# Patient Record
Sex: FEMALE
[De-identification: names, ages, dates, MRNs, and addresses within clinical notes are randomized per-mention and may not be internally consistent; named-entity substitution may affect disease eponyms.]

---

## 2018-11-29 ENCOUNTER — HOSPITAL ENCOUNTER (EMERGENCY)
Dept: HOSPITAL 97 - ER | Age: 28
Discharge: HOME | End: 2018-11-29
Payer: SELF-PAY
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
## 2018-11-29 DIAGNOSIS — S30.1XXA: Primary | ICD-10-CM
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
## 2018-11-29 DIAGNOSIS — Z3A.01: ICD-10-CM

## 2018-11-29 LAB — UA COMPLETE W REFLEX CULTURE PNL UR: (no result)

## 2018-11-29 PROCEDURE — 76813 OB US NUCHAL MEAS 1 GEST: CPT

## 2018-11-29 PROCEDURE — 87086 URINE CULTURE/COLONY COUNT: CPT

## 2018-11-29 PROCEDURE — 81015 MICROSCOPIC EXAM OF URINE: CPT

## 2018-11-29 PROCEDURE — 87088 URINE BACTERIA CULTURE: CPT

## 2018-11-29 PROCEDURE — 99284 EMERGENCY DEPT VISIT MOD MDM: CPT

## 2018-11-29 PROCEDURE — 81003 URINALYSIS AUTO W/O SCOPE: CPT

## 2018-11-29 NOTE — RAD REPORT
EXAM DESCRIPTION:  US - TRANSVAG OB - 2018 8:38 pm

 

CLINICAL HISTORY:  Pregnancy status post fall

 

COMPARISON:  None

 

FINDINGS:  The uterus measures 8 x 5 x 5 centimeters. A gestational sac is not seen within the endome
trium. Small amount of fluid is present within the endometrium. A fibroid is not seen

 

Ovaries are normal in size and echotexture. No significant free fluid

 

IMPRESSION:  Gestational sac is not seen within the endometrium. This could represent a normal intrau
terine pregnancy in which the gestational sac is not seen secondary to the early age. An  can
 also have this appearance.

 

A follow up endovaginal sonogram in 1 week is recommended

## 2018-11-29 NOTE — ER
Nurse's Notes                                                                                     

 [facility]                                                                

Name: Dipti Donovan                                                                               

Age: 28 yrs                                                                                       

Sex: Female                                                                                       

: 1990                                                                                   

MRN: [id number]                                                                                   

Arrival Date: 2018                                                                          

Time: 17:30                                                                                       

Account#: [id number]                                                                            

Bed 26                                                                                            

Private MD:                                                                                       

Diagnosis: Contusion of abdominal wall;early pregnancy                                            

                                                                                                  

Presentation:                                                                                     

                                                                                             

17:57 Presenting complaint: Patient states: Reports slipping while walking down stairs and    aj  

      sliding on her bottom down "a few stairs". Reports lower back pain that started today.      

      Denies vaginal bleeding. Ambulated with steady quick gait and displayed no discomfort       

      when sitting or standing. Care prior to arrival: None. Mechanism of Injury: Fall from       

      standing position. Trauma event details: Injury occurred in the [facility],         

      Injury occurred: at home. Injury occurred: 2018.                               

17:57 Acuity: MATTHEW 4                                                                           aj  

17:57 Method Of Arrival: Ambulatory                                                           aj  

20:54 Transition of care: patient was not received from another setting of care. Onset of     rv  

      symptoms was 2018 at 12:00. Risk Assessment: Do you want to hurt yourself      

      or someone else? Patient reports no desire to harm self or others. Initial Sepsis           

      Screen: Does the patient meet any 2 criteria? No. Patient's initial sepsis screen is        

      negative. Does the patient have a suspected source of infection? No. Patient's initial      

      sepsis screen is negative.                                                                  

                                                                                                  

OB/GYN:                                                                                           

18:01 LMP 10/23/2018                                                                            

                                                                                                  

Trauma Activation: Not Applicable                                                                 

 Physician: ED Physician; Name: ; Notified At: ; Arrived At:                                      

 Physician: General Surgeon; Name: ; Notified At: ; Arrived At:                                   

 Physician: Radiology; Name: ; Notified At: ; Arrived At:                                         

 Physician: Respiratory; Name: ; Notified At: ; Arrived At:                                       

 Physician: Lab; Name: ; Notified At: ; Arrived At:                                               

                                                                                                  

Historical:                                                                                       

- Allergies:                                                                                      

18:01 No Known Allergies;                                                                     aj  

- Home Meds:                                                                                      

18:01 None [Active];                                                                          aj  

- PMHx:                                                                                           

18:01 None;                                                                                   aj  

- PSHx:                                                                                           

18:01 None;                                                                                   aj  

                                                                                                  

- Immunization history: Last tetanus immunization: - up to date.                                  

- Social history:: Smoking status: Patient/guardian denies using tobacco.                         

- Ebola Screening: : Patient negative for fever greater than or equal to 101.5 degrees            

  Fahrenheit, and additional compatible Ebola Virus Disease symptoms Patient denies               

  exposure to infectious person Patient denies travel to an Ebola-affected area in the            

  21 days before illness onset No symptoms or risks identified at this time.                      

                                                                                                  

                                                                                                  

Screenin:53 Abuse screen: Denies threats or abuse. Denies injuries from another. Nutritional        rv  

      screening: No deficits noted. Tuberculosis screening: No symptoms or risk factors           

      identified. Fall Risk None identified.                                                      

                                                                                                  

Primary Survey:                                                                                   

17:57 Breathing/Chest: Respiratory pattern: regular, Respiratory effort: spontaneous,         aj  

      unlabored, Breath sounds: clear, bilaterally. Circulation: Skin color: pink, Skin           

      temperature: warm, dry. Disability Alert.                                                   

20:54 Reassessment Breathing/Chest Respiratory pattern Regular.                               rv  

                                                                                                  

Assessment:                                                                                       

17:57 General: Appears in no apparent distress. comfortable, Behavior is calm, cooperative,   aj  

      appropriate for age. Pain: Complains of pain in buttocks. Neuro: Level of Consciousness     

      is awake, alert, obeys commands, Oriented to person, place, time, situation,                

      Appropriate for age. Respiratory: Airway is patent Respiratory effort is even,              

      unlabored, Respiratory pattern is regular, symmetrical. Derm: Skin is intact, is            

      healthy with good turgor, Skin is pink, warm \T\ dry. normal. Musculoskeletal: Reports      

      pain in buttocks.                                                                           

19:59 Reassessment: Patient appears in no apparent distress at this time.                     rv  

                                                                                                  

Vital Signs:                                                                                      

17:57  / 66; Pulse 94; Resp 20; Temp 97.5; Pulse Ox 100% on R/A; Weight 57.15 kg;       aj  

      Height 5 ft. 1 in. (154.94 cm);                                                             

19:59  / 90; Pulse 88; Resp 16; Pulse Ox 100% on R/A;                                   rv  

20:00 BP 98 / 61; Pulse 82; Resp 16; Pulse Ox 100% ;                                          rv  

20:00 Pulse 84; Resp 17; Pulse Ox 100% ;                                                      rv  

20:53  / 59; Pulse 82; Resp 15; Pulse Ox 100% on R/A;                                   rv  

17:57 Body Mass Index 23.81 (57.15 kg, 154.94 cm)                                             aj  

                                                                                                  

Brody Coma Score:                                                                               

17:57 Eye Response: spontaneous(4). Verbal Response: oriented(5). Motor Response: obeys         

      commands(6). Total: 15.                                                                     

                                                                                                  

Trauma Score (Adult):                                                                             

17:57 Eye Response: spontaneous(1); Verbal Response: oriented(1); Motor Response: obeys       aj  

      commands(2); Systolic BP: > 89 mm Hg(4); Respiratory Rate: 10 to 29 per min(4); Brody     

      Score: 15; Trauma Score: 12                                                                 

                                                                                                  

ED Course:                                                                                        

17:30 Patient arrived in ED.                                                                  mr  

17:59 Triage completed.                                                                       aj  

18:01 Arm band placed on left wrist. Patient placed in waiting room, Patient notified of wait aj  

      time.                                                                                       

19:41 Myranda Hidalgo LVN is Primary Nurse.                                                     ed1 

19:41 Kiko Koch MD is Attending Physician.                                              gs  

19:58 Urine Culture Sent.                                                                     rv  

19:58 Urine Microscopic Only Sent.                                                            rv  

20:38 TRANSVAG OB In Process Unspecified.                                                     EDMS

20:38 Ultrasound completed. Patient tolerated well. Patient moved back from ultrasound.       cy  

20:46 Becki Beth MD is Referral Physician.                                                  gs  

20:53 No provider procedures requiring assistance completed. Patient did not have IV access   rv  

      during this emergency room visit.                                                           

20:55 Patient has correct armband on for positive identification. Bed in low position. Call   rv  

      light in reach. Side rails up X 1. Pulse ox on. NIBP on.                                    

                                                                                                  

Administered Medications:                                                                         

No medications were administered                                                                  

                                                                                                  

                                                                                                  

Outcome:                                                                                          

20:47 Discharge ordered by MD.                                                                gs  

20:54 Discharged to home ambulatory.                                                          rv  

20:54 Condition: good                                                                             

20:54 Discharge instructions given to patient, Instructed on discharge instructions, follow       

      up and referral plans. Demonstrated understanding of instructions, follow-up care.          

20:55 Patient left the ED.                                                                    rv  

                                                                                                  

Signatures:                                                                                       

Dispatcher [employer]                           EDShaunna Hicks, GINA HansenaLizzette                                 mr                                                   

GwenMyranda, SHIRA                       LVN  ed1                                                  

Kiko Koch MD MD gs Yong, Chheannith                                                                                

Elder Mari RN RN   rv                                                   

                                                                                                  

**************************************************************************************************

## 2018-12-16 ENCOUNTER — HOSPITAL ENCOUNTER (EMERGENCY)
Dept: HOSPITAL 97 - ER | Age: 28
Discharge: HOME | End: 2018-12-16
Payer: COMMERCIAL

## 2018-12-16 DIAGNOSIS — O20.0: Primary | ICD-10-CM

## 2018-12-16 DIAGNOSIS — Z3A.08: ICD-10-CM

## 2018-12-16 LAB
BUN BLD-MCNC: 15 MG/DL (ref 7–18)
GLUCOSE SERPLBLD-MCNC: 97 MG/DL (ref 74–106)
HCG SERPL-ACNC: 1369 MIU/ML (ref 1–3)
HCT VFR BLD CALC: 43.4 % (ref 36–45)
LYMPHOCYTES # SPEC AUTO: 1.4 K/UL (ref 0.7–4.9)
MCH RBC QN AUTO: 25.4 PG (ref 27–35)
MCV RBC: 78.6 FL (ref 80–100)
PMV BLD: 8.9 FL (ref 7.6–11.3)
POTASSIUM SERPL-SCNC: 3.4 MMOL/L (ref 3.5–5.1)
RBC # BLD: 5.52 M/UL (ref 3.86–4.86)

## 2018-12-16 PROCEDURE — 81025 URINE PREGNANCY TEST: CPT

## 2018-12-16 PROCEDURE — 36415 COLL VENOUS BLD VENIPUNCTURE: CPT

## 2018-12-16 PROCEDURE — 99283 EMERGENCY DEPT VISIT LOW MDM: CPT

## 2018-12-16 PROCEDURE — 85025 COMPLETE CBC W/AUTO DIFF WBC: CPT

## 2018-12-16 PROCEDURE — 96360 HYDRATION IV INFUSION INIT: CPT

## 2018-12-16 PROCEDURE — 86901 BLOOD TYPING SEROLOGIC RH(D): CPT

## 2018-12-16 PROCEDURE — 80048 BASIC METABOLIC PNL TOTAL CA: CPT

## 2018-12-16 PROCEDURE — 81003 URINALYSIS AUTO W/O SCOPE: CPT

## 2018-12-16 PROCEDURE — 84702 CHORIONIC GONADOTROPIN TEST: CPT

## 2018-12-16 PROCEDURE — 86900 BLOOD TYPING SEROLOGIC ABO: CPT

## 2018-12-16 PROCEDURE — 76817 TRANSVAGINAL US OBSTETRIC: CPT

## 2018-12-16 NOTE — RAD REPORT
EXAM DESCRIPTION:  US - Transvaginal OB - 12/16/2018 6:29 pm

 

CLINICAL HISTORY:  Vaginal bleeding, beta HCG 1369

 

COMPARISON:  None.

 

FINDINGS:  Both ovaries are identifiable. Doppler evaluation shows normal blood flow within the ovari
an stroma. No adnexal mass to suspect an ectopic pregnancy. No blood or fluid in the cul-de-sac.

 

Uterus is 10.1 x 4.4 x 4.9 cm. No myometrial mass identifiable.

 

No intrauterine gestational sac, yolk sac or fetal pole identifiable. There is heterogeneous material
 in the lower uterine segment and heterogeneity of the endometrial tissue in the fundal and midportio
n of the uterus.

 

IMPRESSION:  No IUP is identified. Heterogeneous material in the lower uterine segment may be remnant
 of miscarriage.

 

No adnexal mass identified. No sonographic findings for ectopic pregnancy.

## 2018-12-16 NOTE — EDPHYS
Physician Documentation                                                                           

 [facility]                                                                

Name: Dipti Donovan                                                                               

Age: 28 yrs                                                                                       

Sex: Female                                                                                       

: 1990                                                                                   

MRN: [id number]                                                                                   

Arrival Date: 2018                                                                          

Time: 15:11                                                                                       

Account#: [id number]                                                                            

Bed 16                                                                                            

Private MD:                                                                                       

ED Physician Kiko Koch                                                                       

HPI:                                                                                              

                                                                                             

15:42 This 28 yrs old  Female presents to ER via Ambulatory with complaints of        kb  

      Vaginal Bleeding, + Preg <12wks.                                                            

15:42 The patient presents to the emergency department with vaginal bleeding, that is         kb  

      moderate. The estimated gestational age is 8 weeks. Pregnancy course: Prenatal care:        

      none, Leakage of Fluid: none appreciated. Previous pregnancies: in previous pregnancies     

      patient has had. Associated signs and symptoms: Pertinent positives: vaginal bleeding,      

      Pertinent negatives: abdominal pain, chest pain, diarrhea, dysuria, fever, frequency,       

      nausea, ruptured membranes, seizure, shortness of breath, vaginal discharge, vomiting.      

      The patient has not experienced similar symptoms in the past. The patient has not           

      recently seen a physician. Pt reports vaginal bleeding that started approx 30 minutes       

      pta. Reports it started when she got out of the shower. .                                   

                                                                                                  

OB/GYN:                                                                                           

15:42  6,  0, Living 5, LMP 10/23/2018                                         kb  

                                                                                                  

Historical:                                                                                       

- Allergies:                                                                                      

15:14 No Known Allergies;                                                                     hj  

- Home Meds:                                                                                      

15:14 Prenatal Vitamin Oral tab 1 tab once daily [Active];                                    hj  

- PMHx:                                                                                           

15:14 None;                                                                                   hj  

- PSHx:                                                                                           

15:14 None;                                                                                   hj  

                                                                                                  

- Immunization history:: Adult Immunizations up to date.                                          

- Social history:: Smoking status: Patient/guardian denies using tobacco,                         

  Patient/guardian denies using alcohol.                                                          

- Ebola Screening: : Patient negative for fever greater than or equal to 101.5 degrees            

  Fahrenheit, and additional compatible Ebola Virus Disease symptoms Patient denies               

  exposure to infectious person Patient denies travel to an Ebola-affected area in the            

  21 days before illness onset.                                                                   

                                                                                                  

                                                                                                  

ROS:                                                                                              

15:42 Constitutional: Negative for fever, chills, and weight loss, Cardiovascular: Negative   kb  

      for chest pain, palpitations, and edema, Respiratory: Negative for shortness of breath,     

      cough, wheezing, and pleuritic chest pain, Abdomen/GI: Negative for abdominal pain,         

      nausea, vomiting, diarrhea, and constipation, Back: Negative for injury and pain,           

      MS/Extremity: Negative for injury and deformity, Skin: Negative for injury, rash, and       

      discoloration, Neuro: Negative for headache, weakness, numbness, tingling, and seizure.     

15:42 : Positive for vaginal bleeding.                                                          

                                                                                                  

Exam:                                                                                             

15:42 Constitutional:  This is a well developed, well nourished patient who is awake, alert,  kb  

      and in no acute distress. Head/Face:  Normocephalic, atraumatic. ENT:  Nares patent. No     

      nasal discharge, no septal abnormalities noted.  Tympanic membranes are normal and          

      external auditory canals are clear.  Oropharynx with no redness, swelling, or masses,       

      exudates, or evidence of obstruction, uvula midline.  Mucous membranes moist. Neck:         

      Trachea midline, no thyromegaly or masses palpated, and no cervical lymphadenopathy.        

      Supple, full range of motion without nuchal rigidity, or vertebral point tenderness.        

      No Meningismus. Chest/axilla:  Normal chest wall appearance and motion.  Nontender with     

      no deformity.  No lesions are appreciated. Cardiovascular:  Regular rate and rhythm         

      with a normal S1 and S2.  No gallops, murmurs, or rubs.  Normal PMI, no JVD.  No pulse      

      deficits. Respiratory:  Lungs have equal breath sounds bilaterally, clear to                

      auscultation and percussion.  No rales, rhonchi or wheezes noted.  No increased work of     

      breathing, no retractions or nasal flaring. Abdomen/GI:  Soft, non-tender, with normal      

      bowel sounds.  No distension or tympany.  No guarding or rebound.  No evidence of           

      tenderness throughout. Skin:  Warm, dry with normal turgor.  Normal color with no           

      rashes, no lesions, and no evidence of cellulitis. MS/ Extremity:  Pulses equal, no         

      cyanosis.  Neurovascular intact.  Full, normal range of motion. Neuro:  Awake and           

      alert, GCS 15, oriented to person, place, time, and situation.  Cranial nerves II-XII       

      grossly intact.  Motor strength 5/5 in all extremities.  Sensory grossly intact.            

      Cerebellar exam normal.  Normal gait.                                                       

                                                                                                  

Vital Signs:                                                                                      

15:15  / 71; Pulse 128; Resp 18; Temp 97.8(O); Pulse Ox 100% on R/A; Weight 57.15 kg;   hj  

      Height 5 ft. 1 in. (154.94 cm); Pain 0/10;                                                  

16:00  / 70; Pulse 89; Resp 16; Pulse Ox 100% on R/A; Pain 0/10;                        rb1 

17:00  / 78; Pulse 82; Resp 17; Pulse Ox 100% on R/A;                                   rb1 

18:00  / 63; Pulse 75; Resp 16; Pulse Ox 100% on R/A;                                   rb1 

18:49  / 75; Pulse 78; Resp 16; Pulse Ox 100% on R/A;                                   rb1 

15:15 Body Mass Index 23.81 (57.15 kg, 154.94 cm)                                             hj  

                                                                                                  

MDM:                                                                                              

15:18 Patient medically screened.                                                             kb  

15:44 Data reviewed: vital signs, nurses notes. Data interpreted: Pulse oximetry: on room air kb  

      is 100 %. Interpretation: normal.                                                           

18:22 Counseling: I had a detailed discussion with the patient and/or guardian regarding: the kb  

      historical points, exam findings, and any diagnostic results supporting the                 

      discharge/admit diagnosis, lab results, radiology results, the need for outpatient          

      follow up, an OB/Gyne specialist, to return to the emergency department if symptoms         

      worsen or persist or if there are any questions or concerns that arise at home.             

                                                                                                  

                                                                                             

15:25 Order name: Quantitative Hcg                                                              

                                                                                             

15:25 Order name: Abo/rh Typing                                                                 

                                                                                             

15:25 Order name: Basic Metabolic Panel                                                         

                                                                                             

15:25 Order name: CBC with Diff                                                                 

                                                                                             

15:32 Order name: Urine Dipstick--Ancillary (enter results)                                   [facility] 

                                                                                             

15:32 Order name: Urine Pregnancy--Ancillary (enter results)                                  [facility] 

                                                                                             

15:34 Order name: Urine Pregnancy--Ancillary; Complete Time: 15:35                            [facility]

                                                                                             

15:34 Order name: Urine Dipstick-Ancillary; Complete Time: 15:35                              [facility]

                                                                                             

17:03 Order name: CBC with Automated Diff; Complete Time: 17:04                               [facility]

                                                                                             

17:09 Order name: Basic Metabolic Panel; Complete Time: 17:12                                 [facility]

                                                                                             

17:09 Order name: HCG, Quantitative; Complete Time: 17:12                                     [facility]

                                                                                             

17:12 Order name: US Transvaginal Ob                                                          kb  

                                                                                             

18:10 Order name: ABO/RH no charge; Complete Time: 18:12                                      [facility]

                                                                                             

18:11 Order name: ABO/RH typing; Complete Time: 18:12                                         [facility]

                                                                                             

15:25 Order name: Urine Pregnancy Test (obtain specimen); Complete Time: 16:13                kb  

                                                                                             

15:25 Order name: IV Saline Lock; Complete Time: 16:13                                        kb  

                                                                                             

15:25 Order name: Labs collected and sent; Complete Time: 16:13                               kb  

                                                                                             

15:25 Order name: NPO; Complete Time: 16:13                                                   kb  

                                                                                             

15:25 Order name: Urine Dipstick-Ancillary (obtain specimen); Complete Time: 16:13            kb  

                                                                                                  

Administered Medications:                                                                         

16:18 Drug: NS 0.9% 1000 ml Route: IV; Rate: 1000 ml; Site: left antecubital;                 rb1 

17:20 Follow up: IV Status: Completed infusion                                                rb1 

18:30 Drug: Tylenol 1000 mg Route: PO;                                                        rb1 

18:50 Follow up: Response: No adverse reaction                                                rb1 

                                                                                                  

                                                                                                  

Disposition:                                                                                      

                                                                                             

16:28 Co-signature as Attending Physician, Kiko Koch MD.                                    

                                                                                                  

Disposition:                                                                                      

18 18:29 Discharged to Home. Impression: Threatened .                               

- Condition is Stable.                                                                            

- Discharge Instructions: Threatened Miscarriage, Easy-to-Read.                                   

                                                                                                  

- Medication Reconciliation Form, Thank You Letter, Antibiotic Education, Prescription            

  Opioid Use form.                                                                                

- Follow up: Emergency Department; When: As needed; Reason: Worsening of condition.               

  Follow up: Private Physician; When: 2 - 3 days; Reason: Recheck today's complaints,             

  Continuance of care, Re-evaluation by your physician.                                           

- Notes: Have repeat quantitative Hcg done in 48 hours                                            

                                                                                                  

                                                                                                  

Signatures:                                                                                       

Dispatcher [employer]                           [facility]                                                 

Meron Deleon FNP-C                 FNP-Sony Tian RN RN                                                      

Bernie Mak RN                     RN   rb1                                                  

Kiko Koch MD MD                                                      

                                                                                                  

Corrections: (The following items were deleted from the chart)                                    

                                                                                             

18:52 18:29 2018 18:29 Discharged to Home. Impression: Threatened . Condition   rb1 

      is Stable. Discharge Instructions: Threatened Miscarriage, Easy-to-Read. Forms are          

      Medication Reconciliation Form, Thank You Letter, Antibiotic Education, Prescription        

      Opioid Use. Follow up: Emergency Department; When: As needed; Reason: Worsening of          

      condition. Follow up: Private Physician; When: 2 - 3 days; Reason: Recheck today's          

      complaints, Continuance of care, Re-evaluation by your physician. kb                        

                                                                                                  

**************************************************************************************************

## 2018-12-16 NOTE — ER
Nurse's Notes                                                                                     

 [facility]                                                                

Name: Dipti Donovan                                                                               

Age: 28 yrs                                                                                       

Sex: Female                                                                                       

: 1990                                                                                   

MRN: [id number]                                                                                   

Arrival Date: 2018                                                                          

Time: 15:11                                                                                       

Account#: [id number]                                                                            

Bed 16                                                                                            

Private MD:                                                                                       

Diagnosis: Threatened                                                                     

                                                                                                  

Presentation:                                                                                     

                                                                                             

15:11 Presenting complaint: Patient states: LMP- 10/23/18; 8 weeks AOG; im bleeding that      hj  

      started today; heavy bleeding; denies abd pain; A0;. Transition of care: patient        

      was not received from another setting of care. Onset of symptoms was 2018.     

      Risk Assessment: Do you want to hurt yourself or someone else? Patient reports no           

      desire to harm self or others. Initial Sepsis Screen: Does the patient meet any 2           

      criteria? No. Patient's initial sepsis screen is negative. Does the patient have a          

      suspected source of infection? No. Patient's initial sepsis screen is negative. Care        

      prior to arrival: None.                                                                     

15:11 Method Of Arrival: Ambulatory                                                             

15:11 Acuity: MATTHEW 3                                                                           hj  

                                                                                                  

Triage Assessment:                                                                                

15:14 General: Appears in no apparent distress. uncomfortable, Behavior is calm, cooperative, hj  

      appropriate for age. Pain: Denies pain. : Reports vaginal bleeding that is heavy flow     

      since few minutes ago;.                                                                     

                                                                                                  

OB/GYN:                                                                                           

15:42  6,  0, Living 5, LMP 10/23/2018                                         kb  

                                                                                                  

Historical:                                                                                       

- Allergies:                                                                                      

15:14 No Known Allergies;                                                                     hj  

- Home Meds:                                                                                      

15:14 Prenatal Vitamin Oral tab 1 tab once daily [Active];                                    hj  

- PMHx:                                                                                           

15:14 None;                                                                                   hj  

- PSHx:                                                                                           

15:14 None;                                                                                   hj  

                                                                                                  

- Immunization history:: Adult Immunizations up to date.                                          

- Social history:: Smoking status: Patient/guardian denies using tobacco,                         

  Patient/guardian denies using alcohol.                                                          

- Ebola Screening: : Patient negative for fever greater than or equal to 101.5 degrees            

  Fahrenheit, and additional compatible Ebola Virus Disease symptoms Patient denies               

  exposure to infectious person Patient denies travel to an Ebola-affected area in the            

  21 days before illness onset.                                                                   

                                                                                                  

                                                                                                  

Screening:                                                                                        

15:15 Abuse screen: Denies threats or abuse. Denies injuries from another. Nutritional        hj  

      screening: No deficits noted. Tuberculosis screening: No symptoms or risk factors           

      identified. Fall Risk None identified.                                                      

                                                                                                  

Assessment:                                                                                       

15:20 General: Appears in no apparent distress. comfortable, Behavior is calm, cooperative,   rb1 

      Denies fever. Pain: Denies pain. Neuro: Level of Consciousness is awake, alert, obeys       

      commands, Oriented to person, place, time, situation, Reports dizziness.                    

      Cardiovascular: Capillary refill < 3 seconds is brisk in bilateral fingers.                 

      Respiratory: Reports cough that is non-productive, since x 2 weeks Airway is patent         

      Respiratory effort is even, unlabored, Respiratory pattern is regular, symmetrical. GI:     

      No signs and/or symptoms were reported involving the gastrointestinal system. :           

      Reports vaginal bleeding that is bright red, moderate flow, since 30 minutes ago Denies     

      blood clots. Derm: Skin is dry, Skin is normal, Skin temperature is warm.                   

16:20 Reassessment: Patient appears in no apparent distress at this time. Patient and/or      rb1 

      family updated on plan of care and expected duration. Pain level reassessed. Patient is     

      alert, oriented x 3, equal unlabored respirations, skin warm/dry/pink. Patient denies       

      pain at this time.                                                                          

17:20 Reassessment: Patient appears in no apparent distress at this time. No changes from     rb1 

      previously documented assessment.                                                           

18:20 Reassessment: Patient appears in no apparent distress at this time. Patient and/or      rb1 

      family updated on plan of care and expected duration. Pain level reassessed. Patient is     

      alert, oriented x 3, equal unlabored respirations, skin warm/dry/pink. Pt. ambulated to     

      the bathroom without difficulty. Pt reports that the vaginal bleeding has stopped at        

      this time. Provider notified.                                                               

                                                                                                  

Vital Signs:                                                                                      

15:15  / 71; Pulse 128; Resp 18; Temp 97.8(O); Pulse Ox 100% on R/A; Weight 57.15 kg;   hj  

      Height 5 ft. 1 in. (154.94 cm); Pain 0/10;                                                  

16:00  / 70; Pulse 89; Resp 16; Pulse Ox 100% on R/A; Pain 0/10;                        rb1 

17:00  / 78; Pulse 82; Resp 17; Pulse Ox 100% on R/A;                                   rb1 

18:00  / 63; Pulse 75; Resp 16; Pulse Ox 100% on R/A;                                   rb1 

18:49  / 75; Pulse 78; Resp 16; Pulse Ox 100% on R/A;                                   rb1 

15:15 Body Mass Index 23.81 (57.15 kg, 154.94 cm)                                               

                                                                                                  

ED Course:                                                                                        

15:11 Patient arrived in ED.                                                                  as  

15:12 Meron Deleon FNP-C is [facility].                                                        kb  

15:12 Kiko Koch MD is Attending Physician.                                              kb  

15:13 Triage completed.                                                                       hj  

15:15 Arm band placed on right wrist.                                                         hj  

15:15 Patient has correct armband on for positive identification. Placed in gown. Bed in low  hj  

      position. Call light in reach. Side rails up X 1.                                           

15:21 Bernie Mak, RN is Primary Nurse.                                                   rb1 

16:20 Pulse ox on. NIBP on.                                                                   rb1 

18:27 Ultrasound completed. Patient tolerated well. Notified NP/PA meron.                   sg3 

18:51 No provider procedures requiring assistance completed. IV discontinued, intact,         rb1 

      bleeding controlled, No redness/swelling at site. Pressure dressing applied.                

                                                                                                  

Administered Medications:                                                                         

16:18 Drug: NS 0.9% 1000 ml Route: IV; Rate: 1000 ml; Site: left antecubital;                 rb1 

17:20 Follow up: IV Status: Completed infusion                                                rb1 

18:30 Drug: Tylenol 1000 mg Route: PO;                                                        rb1 

18:50 Follow up: Response: No adverse reaction                                                rb1 

                                                                                                  

                                                                                                  

Outcome:                                                                                          

18:29 Discharge ordered by MD.                                                                kb  

18:51 Discharged to home ambulatory.                                                          rb1 

18:51 Condition: stable                                                                           

18:51 Discharge instructions given to patient, Instructed on discharge instructions, follow       

      up and referral plans. Demonstrated understanding of instructions, follow-up care,          

      Prescriptions given X none                                                                  

18:52 Patient left the ED.                                                                    rb1 

                                                                                                  

Signatures:                                                                                       

Meron Deleon FNP-C FNP-Juana Schwarz Henry, RN                      RN                                                      

Bernie Mak, RN                     RN   rb1                                                  

Katherine Houser                               sg3                                                  

                                                                                                  

Corrections: (The following items were deleted from the chart)                                    

15:17 15:15 Pulse 128bpm; Resp 18bpm; Pulse Ox 100% RA; Temp 97.8F Oral; 57.15 kg; Height 5   hj  

      ft. 1 in.; BMI: 23.8; Pain 0/10; hj                                                         

                                                                                                  

**************************************************************************************************

## 2018-12-18 ENCOUNTER — HOSPITAL ENCOUNTER (EMERGENCY)
Dept: HOSPITAL 97 - ER | Age: 28
Discharge: HOME | End: 2018-12-18
Payer: SELF-PAY

## 2018-12-18 DIAGNOSIS — O03.9: Primary | ICD-10-CM

## 2018-12-18 LAB
HCT VFR BLD CALC: 38.6 % (ref 36–45)
LYMPHOCYTES # SPEC AUTO: 1.4 K/UL (ref 0.7–4.9)
PMV BLD: 8.2 FL (ref 7.6–11.3)
RBC # BLD: 4.84 M/UL (ref 3.86–4.86)

## 2018-12-18 PROCEDURE — 36415 COLL VENOUS BLD VENIPUNCTURE: CPT

## 2018-12-18 PROCEDURE — 99283 EMERGENCY DEPT VISIT LOW MDM: CPT

## 2018-12-18 PROCEDURE — 84702 CHORIONIC GONADOTROPIN TEST: CPT

## 2018-12-18 PROCEDURE — 85025 COMPLETE CBC W/AUTO DIFF WBC: CPT

## 2018-12-18 NOTE — EDPHYS
Physician Documentation                                                                           

 [facility]                                                                

Name: Dipti Donovan                                                                               

Age: 28 yrs                                                                                       

Sex: Female                                                                                       

: 1990                                                                                   

MRN: [id number]                                                                                   

Arrival Date: 2018                                                                          

Time: 17:56                                                                                       

Account#: [id number]                                                                            

Bed 16                                                                                            

Private MD:                                                                                       

ED Physician José Manuel Sparks                                                                      

HPI:                                                                                              

                                                                                             

18:57 This 28 yrs old  Female presents to ER via Ambulatory with complaints of LAB    snw 

      WORK.                                                                                       

18:57 The patient presents with pt needs repeat Hcg. Onset: The symptoms/episode              snw 

      began/occurred suddenly, 3 day(s) ago, and improved today. Severity of symptoms: At         

      their worst the symptoms were moderate, in the emergency department the symptoms have       

      improved, markedly. The patient has not experienced similar symptoms in the past. The       

      patient has been recently seen by a physician: The patient has been recently seen at        

      the [facility] Emergency Department, this week, for similar          

      complaints the patient was told to return for a recheck, for repeat QHcg.                   

                                                                                                  

Historical:                                                                                       

- Allergies:                                                                                      

17:59 No Known Allergies;                                                                     ch  

- Home Meds:                                                                                      

17:59 Prenatal Vitamin Oral tab 1 tab once daily [Active];                                    ch  

- PMHx:                                                                                           

17:59 None;                                                                                   ch  

- PSHx:                                                                                           

17:59 D \T\ C;                                                                                  ch

                                                                                                  

- Immunization history:: Adult Immunizations up to date.                                          

- Social history:: Smoking status: Patient/guardian denies using tobacco.                         

- Ebola Screening: : Patient negative for fever greater than or equal to 101.5 degrees            

  Fahrenheit, and additional compatible Ebola Virus Disease symptoms Patient denies               

  exposure to infectious person Patient denies travel to an Ebola-affected area in the            

  21 days before illness onset No symptoms or risks identified at this time.                      

                                                                                                  

                                                                                                  

ROS:                                                                                              

18:55 Constitutional: Negative for fever, chills, and weight loss, Eyes: Negative for injury, snw 

      pain, redness, and discharge, ENT: Negative for injury, pain, and discharge, Neck:          

      Negative for injury, pain, and swelling, Cardiovascular: Negative for chest pain,           

      palpitations, and edema, Respiratory: Negative for shortness of breath, cough,              

      wheezing, and pleuritic chest pain, Abdomen/GI: Negative for abdominal pain, nausea,        

      vomiting, diarrhea, and constipation, Back: Negative for injury and pain, MS/Extremity:     

      Negative for injury and deformity, Skin: Negative for injury, rash, and discoloration,      

      Neuro: Negative for headache, weakness, numbness, tingling, and seizure.                    

18:55 : Positive for recheck miscarriage or pregnancy progress.                                 

                                                                                                  

Exam:                                                                                             

18:55 Constitutional:  This is a well developed, well nourished patient who is awake, alert,  snw 

      and in no acute distress. Head/Face:  Normocephalic, atraumatic. Eyes:  Pupils equal        

      round and reactive to light, extra-ocular motions intact.  Lids and lashes normal.          

      Conjunctiva and sclera are non-icteric and not injected.  Cornea within normal limits.      

      Periorbital areas with no swelling, redness, or edema. ENT:  Nares patent. No nasal         

      discharge, no septal abnormalities noted.  Tympanic membranes are normal and external       

      auditory canals are clear.  Oropharynx with no redness, swelling, or masses, exudates,      

      or evidence of obstruction, uvula midline.  Mucous membranes moist. Neck:  Trachea          

      midline, no thyromegaly or masses palpated, and no cervical lymphadenopathy.  Supple,       

      full range of motion without nuchal rigidity, or vertebral point tenderness.  No            

      Meningismus. Chest/axilla:  Normal chest wall appearance and motion.  Nontender with no     

      deformity.  No lesions are appreciated. Cardiovascular:  Regular rate and rhythm with a     

      normal S1 and S2.  No gallops, murmurs, or rubs.  Normal PMI, no JVD.  No pulse             

      deficits. Respiratory:  Lungs have equal breath sounds bilaterally, clear to                

      auscultation and percussion.  No rales, rhonchi or wheezes noted.  No increased work of     

      breathing, no retractions or nasal flaring. Abdomen/GI:  Soft, non-tender, with normal      

      bowel sounds.  No distension or tympany.  No guarding or rebound.  No evidence of           

      tenderness throughout. Back:  No spinal tenderness.  No costovertebral tenderness.          

      Full range of motion. Skin:  Warm, dry with normal turgor.  Normal color with no            

      rashes, no lesions, and no evidence of cellulitis. MS/ Extremity:  Pulses equal, no         

      cyanosis.  Neurovascular intact.  Full, normal range of motion. Neuro:  Awake and           

      alert, GCS 15, oriented to person, place, time, and situation.  Cranial nerves II-XII       

      grossly intact.  Motor strength 5/5 in all extremities.  Sensory grossly intact.            

      Cerebellar exam normal.  Normal gait. Psych:  Awake, alert, with orientation to person,     

      place and time.  Behavior, mood, and affect are within normal limits.                       

                                                                                                  

Vital Signs:                                                                                      

17:59  / 62; Pulse 71; Resp 16; Temp 98.5; Pulse Ox 99% on R/A; Weight 57.15 kg; Height ch  

      5 ft. 1 in. (154.94 cm); Pain 0/10;                                                         

19:18  / 75; Pulse 60; Resp 16; Pulse Ox 100% on R/A;                                   jb4 

17:59 Body Mass Index 23.81 (57.15 kg, 154.94 cm)                                               

                                                                                                  

MDM:                                                                                              

18:19 Patient medically screened.                                                             [facility] 

19:00 Data reviewed: vital signs, nurses notes. Data interpreted: Pulse oximetry: on room air snw 

      is 99 %. Interpretation: normal. Counseling: I had a detailed discussion with the           

      patient and/or guardian regarding: the historical points, exam findings, and any            

      diagnostic results supporting the discharge/admit diagnosis, lab results, the need for      

      outpatient follow up, to return to the emergency department if symptoms worsen or           

      persist or if there are any questions or concerns that arise at home. Special               

      discussion: Based on the history and exam findings, there is no indication for further      

      emergent testing or inpatient evaluation. I discussed with the patient/guardian the         

      need to see the OB Gyne specialist for further evaluation of the symptoms. I discussed      

      with the patient/guardian the need to see the primary care provider for further             

      evaluation of the symptoms.                                                                 

                                                                                                  

12                                                                                             

18:14 Order name: CBC with Diff; Complete Time: 18:43                                         snw 

12                                                                                             

18:14 Order name: HCG-Quantitative; Complete Time: 18:58                                      snw 

                                                                                                  

Administered Medications:                                                                         

No medications were administered                                                                  

                                                                                                  

                                                                                                  

Disposition:                                                                                      

                                                                                             

06:42 Co-signature as Attending Physician, José Manuel Sparks MD I agree with the assessment and  [facility] 

      plan of care.                                                                               

                                                                                                  

Disposition:                                                                                      

18 18:59 Discharged to Home. Impression: Spontaneous .                              

- Condition is Stable.                                                                            

- Discharge Instructions: Miscarriage.                                                            

- Prescriptions for Prenatal Vitamin 27- 0.8 mg Oral Tablet - take 1 tablet by ORAL               

  route once daily; 60 tablet. Diclofenac Sodium 75 mg Oral Tablet Sustained Release -            

  take 1 tablet by ORAL route 2 times per day; 30 tablet.                                         

- Medication Reconciliation Form, Thank You Letter, Antibiotic Education, Prescription            

  Opioid Use form.                                                                                

- Follow up: Private Physician; When: 2 - 3 days; Reason: Recheck today's complaints,             

  Continuance of care. Follow up: Emergency Department; When: As needed; Reason:                  

  Worsening of condition.                                                                         

                                                                                                  

                                                                                                  

                                                                                                  

Signatures:                                                                                       

Dispatcher [employer]                           Angelina Allen, RN                  RN   José Manuel Pennington MD MD cha Therrien, Shelly, FNP-C                 FNP-Csnw                                                  

Kem Aguirre RN                       RN   jb4                                                  

                                                                                                  

Corrections: (The following items were deleted from the chart)                                    

                                                                                             

19:20 18:59 2018 18:59 Discharged to Home. Impression: Spontaneous . Condition  jb4 

      is Stable. Forms are Medication Reconciliation Form, Thank You Letter, Antibiotic           

      Education, Prescription Opioid Use. Follow up: Private Physician; When: 2 - 3 days;         

      Reason: Recheck today's complaints, Continuance of care. Follow up: Emergency               

      Department; When: As needed; Reason: Worsening of condition. snw                            

                                                                                                  

**************************************************************************************************

## 2018-12-18 NOTE — ER
Nurse's Notes                                                                                     

 [facility]                                                                

Name: Dipti Donovan                                                                               

Age: 28 yrs                                                                                       

Sex: Female                                                                                       

: 1990                                                                                   

MRN: [id number]                                                                                   

Arrival Date: 2018                                                                          

Time: 17:56                                                                                       

Account#: [id number]                                                                            

Bed 16                                                                                            

Private MD:                                                                                       

Diagnosis: Spontaneous                                                                    

                                                                                                  

Presentation:                                                                                     

                                                                                             

17:57 Presenting complaint: Patient states: I had a miscarriage , and you all said to   ch  

      come back and get my blood re drawn. Transition of care: patient was not received from      

      another setting of care. Onset of symptoms was 2018. Risk Assessment: Do       

      you want to hurt yourself or someone else? Patient reports no desire to harm self or        

      others. Initial Sepsis Screen: Does the patient meet any 2 criteria? No. Patient's          

      initial sepsis screen is negative. Does the patient have a suspected source of              

      infection? No. Patient's initial sepsis screen is negative. Care prior to arrival: None.    

17:57 Method Of Arrival: Ambulatory                                                             

17:57 Acuity: MATTHEW 4                                                                             

                                                                                                  

Triage Assessment:                                                                                

17:59 General: Appears in no apparent distress. comfortable, Behavior is calm, cooperative,   ch  

      appropriate for age. Pain: Denies pain.                                                     

                                                                                                  

Historical:                                                                                       

- Allergies:                                                                                      

17:59 No Known Allergies;                                                                       

- Home Meds:                                                                                      

17:59 Prenatal Vitamin Oral tab 1 tab once daily [Active];                                    ch  

- PMHx:                                                                                           

17:59 None;                                                                                     

- PSHx:                                                                                           

17:59 D \T\ C;                                                                                  ch

                                                                                                  

- Immunization history:: Adult Immunizations up to date.                                          

- Social history:: Smoking status: Patient/guardian denies using tobacco.                         

- Ebola Screening: : Patient negative for fever greater than or equal to 101.5 degrees            

  Fahrenheit, and additional compatible Ebola Virus Disease symptoms Patient denies               

  exposure to infectious person Patient denies travel to an Ebola-affected area in the            

  21 days before illness onset No symptoms or risks identified at this time.                      

                                                                                                  

                                                                                                  

Screenin:00 Abuse screen: Denies threats or abuse. Denies injuries from another. Nutritional        bp  

      screening: No deficits noted. Tuberculosis screening: No symptoms or risk factors           

      identified. Fall Risk None identified.                                                      

                                                                                                  

Assessment:                                                                                       

18:00 General: Appears in no apparent distress. comfortable, Behavior is calm, cooperative,   bp  

      appropriate for age. Pain: Denies pain. Neuro: Level of Consciousness is awake, alert,      

      obeys commands, Oriented to person, place, time, situation, Appropriate for age.            

      Cardiovascular: No deficits noted. Respiratory: Airway is patent Respiratory effort is      

      even, unlabored, Respiratory pattern is regular, symmetrical. GI: No signs and/or           

      symptoms were reported involving the gastrointestinal system. : No signs and/or           

      symptoms were reported regarding the genitourinary system. EENT: No deficits noted.         

      Derm: No deficits noted. Musculoskeletal: Circulation, motion, and sensation intact.        

      Range of motion: intact in all extremities.                                                 

19:18 Reassessment: Patient appears in no apparent distress at this time. Patient and/or      jb4 

      family updated on plan of care and expected duration. Pain level reassessed. Patient is     

      alert, oriented x 3, equal unlabored respirations, skin warm/dry/pink.                      

                                                                                                  

Vital Signs:                                                                                      

17:59  / 62; Pulse 71; Resp 16; Temp 98.5; Pulse Ox 99% on R/A; Weight 57.15 kg; Height ch  

      5 ft. 1 in. (154.94 cm); Pain 0/10;                                                         

19:18  / 75; Pulse 60; Resp 16; Pulse Ox 100% on R/A;                                   jb4 

17:59 Body Mass Index 23.81 (57.15 kg, 154.94 cm)                                               

                                                                                                  

ED Course:                                                                                        

17:56 Patient arrived in ED.                                                                  sb2 

17:58 Triage completed.                                                                       ch  

17:59 Arm band placed on left wrist. Patient placed in an exam room, on a stretcher.          ch  

18:00 Patient has correct armband on for positive identification. Bed in low position. Call   bp  

      light in reach. Side rails up X2.                                                           

18:04 Daniel Castellon, RN is Primary Nurse.                                                    bp  

18:14 Ayala Abdi FNP-C is PHCP.                                                        snw 

18:14 José Manuel Sparks MD is Attending Physician.                                             snw 

18:30 Initial lab(s) drawn, by ED staff, sent to lab. Inserted saline lock: 20 gauge in right mh5 

      antecubital area, using aseptic technique. Blood collected.                                 

18:30 HCG-Quantitative Sent.                                                                  5 

18:30 CBC with Diff Sent.                                                                     5 

19:18 No provider procedures requiring assistance completed. IV discontinued, intact,         jb4 

      bleeding controlled.                                                                        

                                                                                                  

Administered Medications:                                                                         

No medications were administered                                                                  

                                                                                                  

                                                                                                  

Outcome:                                                                                          

18:59 Discharge ordered by MD.                                                                snw 

19:18 Discharged to home ambulatory.                                                          jb4 

19:18 Condition: stable                                                                           

19:18 Discharge instructions given to patient, Instructed on discharge instructions, follow       

      up and referral plans. medication usage, Demonstrated understanding of instructions,        

      follow-up care, medications, Prescriptions given X 2.                                       

19:20 Patient left the ED.                                                                    jb4 

                                                                                                  

Signatures:                                                                                       

Angelina Vásquez, RN                  RN                                                      

Ayala Abdi, FNP-C                 FNP-Csnw                                                  

Kem Aguirre RN                       RN   jb4                                                  

Sanaz Berrios                              [facility]                                                  

Daniel Castellon RN                      RN   Janet Newell                               2                                                  

                                                                                                  

**************************************************************************************************

## 2019-08-21 ENCOUNTER — HOSPITAL ENCOUNTER (EMERGENCY)
Dept: HOSPITAL 97 - ER | Age: 29
Discharge: HOME | End: 2019-08-21
Payer: SELF-PAY

## 2019-08-21 DIAGNOSIS — O26.891: Primary | ICD-10-CM

## 2019-08-21 LAB
BUN BLD-MCNC: 9 MG/DL (ref 7–18)
GLUCOSE SERPLBLD-MCNC: 110 MG/DL (ref 74–106)
HCG SERPL-ACNC: (no result) MIU/ML (ref 1–3)
HCT VFR BLD CALC: 41.4 % (ref 36–45)
LYMPHOCYTES # SPEC AUTO: 1.7 K/UL (ref 0.7–4.9)
PMV BLD: 8.4 FL (ref 7.6–11.3)
POTASSIUM SERPL-SCNC: 3.7 MMOL/L (ref 3.5–5.1)
RBC # BLD: 5.28 M/UL (ref 3.86–4.86)

## 2019-08-21 PROCEDURE — 80048 BASIC METABOLIC PNL TOTAL CA: CPT

## 2019-08-21 PROCEDURE — 99284 EMERGENCY DEPT VISIT MOD MDM: CPT

## 2019-08-21 PROCEDURE — 81025 URINE PREGNANCY TEST: CPT

## 2019-08-21 PROCEDURE — 36415 COLL VENOUS BLD VENIPUNCTURE: CPT

## 2019-08-21 PROCEDURE — 86900 BLOOD TYPING SEROLOGIC ABO: CPT

## 2019-08-21 PROCEDURE — 84702 CHORIONIC GONADOTROPIN TEST: CPT

## 2019-08-21 PROCEDURE — 76815 OB US LIMITED FETUS(S): CPT

## 2019-08-21 PROCEDURE — 86901 BLOOD TYPING SEROLOGIC RH(D): CPT

## 2019-08-21 PROCEDURE — 85025 COMPLETE CBC W/AUTO DIFF WBC: CPT

## 2019-08-21 PROCEDURE — 81003 URINALYSIS AUTO W/O SCOPE: CPT

## 2019-08-21 NOTE — EDPHYS
Physician Documentation                                                                           

 [facility]                                                                 

Name: Dipti Donovan                                                                               

Age: 28 yrs                                                                                       

Sex: Female                                                                                       

: 1990                                                                                   

MRN: [id number]                                                                                   

Arrival Date: 2019                                                                          

Time: 17:49                                                                                       

Account#: [id number]                                                                            

Bed 23                                                                                            

Private MD:                                                                                       

ED Physician Parish Meek                                                                         

HPI:                                                                                              

                                                                                             

19:19 This 28 yrs old  Female presents to ER via Ambulatory with complaints of 16 wks kb  

      pregnant, Abdominal Cramping.                                                               

19:19 The patient presents to the emergency department with abdominal pain, of the suprapubic kb  

      area, that started 4 day(s) ago, described as crampy. The estimated gestational age is      

      16 weeks. Pregnancy course: Prenatal care: none. Previous pregnancies: in previous          

      pregnancies patient has had. Associated signs and symptoms: Pertinent positives:            

      abdominal pain, Pertinent negatives: vaginal bleeding, vaginal discharge. The patient       

      has not experienced similar symptoms in the past. The patient has not recently seen a       

      physician.                                                                                  

                                                                                                  

OB/GYN:                                                                                           

17:54  8, Full Term 5,  2                                                      la1 

17:55 LMP 2019                                                                            la1 

19:19  8,  2, Living 4, LMP 2019                                           kb  

                                                                                                  

Historical:                                                                                       

- Allergies:                                                                                      

17:54 No Known Allergies;                                                                     la1 

- Home Meds:                                                                                      

17:54 Prenatal Vitamin Oral tab 1 tab once daily [Active];                                    la1 

- PMHx:                                                                                           

17:54 None;                                                                                   la1 

- PSHx:                                                                                           

17:54 D \T\ C;                                                                                  la1

                                                                                                  

- Immunization history:: Adult Immunizations up to date.                                          

- Social history:: Smoking status: Patient/guardian denies using tobacco.                         

- Ebola Screening: : No symptoms or risks identified at this time.                                

                                                                                                  

                                                                                                  

ROS:                                                                                              

19:18 Constitutional: Negative for fever, chills, and weight loss, Cardiovascular: Negative   kb  

      for chest pain, palpitations, and edema, Respiratory: Negative for shortness of breath,     

      cough, wheezing, and pleuritic chest pain, Back: Negative for injury and pain, :          

      Negative for injury, bleeding, discharge, and swelling, MS/Extremity: Negative for          

      injury and deformity, Skin: Negative for injury, rash, and discoloration, Neuro:            

      Negative for headache, weakness, numbness, tingling, and seizure.                           

19:18 Abdomen/GI: Positive for abdominal cramps.                                                  

                                                                                                  

Exam:                                                                                             

19:18 Constitutional:  This is a well developed, well nourished patient who is awake, alert,  kb  

      and in no acute distress. Head/Face:  Normocephalic, atraumatic. Neck:  Trachea             

      midline, no thyromegaly or masses palpated, and no cervical lymphadenopathy.  Supple,       

      full range of motion without nuchal rigidity, or vertebral point tenderness.  No            

      Meningismus. Chest/axilla:  Normal chest wall appearance and motion.  Nontender with no     

      deformity.  No lesions are appreciated. Cardiovascular:  Regular rate and rhythm with a     

      normal S1 and S2.  No gallops, murmurs, or rubs.  Normal PMI, no JVD.  No pulse             

      deficits. Respiratory:  Lungs have equal breath sounds bilaterally, clear to                

      auscultation and percussion.  No rales, rhonchi or wheezes noted.  No increased work of     

      breathing, no retractions or nasal flaring. Abdomen/GI:  Soft, non-tender, with normal      

      bowel sounds.  No distension or tympany.  No guarding or rebound.  No evidence of           

      tenderness throughout. Skin:  Warm, dry with normal turgor.  Normal color with no           

      rashes, no lesions, and no evidence of cellulitis. MS/ Extremity:  Pulses equal, no         

      cyanosis.  Neurovascular intact.  Full, normal range of motion. Neuro:  Awake and           

      alert, GCS 15, oriented to person, place, time, and situation.  Cranial nerves II-XII       

      grossly intact.  Motor strength 5/5 in all extremities.  Sensory grossly intact.            

      Cerebellar exam normal.  Normal gait.                                                       

                                                                                                  

Vital Signs:                                                                                      

17:54  / 90; Pulse 78; Resp 18; Temp 97.6; Pulse Ox 98% on R/A; Weight 59.42 kg; Height la1 

      5 ft. 1 in. (154.94 cm);                                                                    

18:05  / 87; Pulse 98; Resp 16 S; Pulse Ox 99% on R/A;                                  ca1 

19:27  / 81; Pulse 95; Resp 16; Pulse Ox 99% on R/A;                                    ca1 

17:54 Body Mass Index 24.75 (59.42 kg, 154.94 cm)                                             la1 

                                                                                                  

MDM:                                                                                              

17:57 Patient medically screened.                                                             kb  

19:18 Data reviewed: vital signs, nurses notes. Data interpreted: Pulse oximetry: on room air kb  

      is 99 %. Interpretation: normal. Counseling: I had a detailed discussion with the           

      patient and/or guardian regarding: the historical points, exam findings, and any            

      diagnostic results supporting the discharge/admit diagnosis, lab results, radiology         

      results, the need for outpatient follow up, an OB/Gyne specialist, to return to the         

      emergency department if symptoms worsen or persist or if there are any questions or         

      concerns that arise at home.                                                                

                                                                                                  

                                                                                             

17:57 Order name: Quantitative Hcg; Complete Time: 19:15                                      kb  

                                                                                             

17:57 Order name: Abo/rh Typing; Complete Time: 19:15                                         kb  

                                                                                             

17:57 Order name: Basic Metabolic Panel; Complete Time: 19:15                                 kb  

                                                                                             

17:57 Order name: CBC with Diff; Complete Time: 18:42                                         kb  

                                                                                             

18:12 Order name: Urine Pregnancy--Ancillary (enter results); Complete Time: 18:42            kb  

                                                                                             

18:12 Order name: Urine Dipstick--Ancillary (enter results); Complete Time: 18:42             kb  

                                                                                             

17:57 Order name: Urine Pregnancy Test (obtain specimen); Complete Time: 18:18                kb  

                                                                                             

17:57 Order name: IV Saline Lock; Complete Time: 18:18                                        kb  

                                                                                             

17:57 Order name: Labs collected and sent; Complete Time: 18:18                               kb  

                                                                                             

17:57 Order name: NPO; Complete Time: 18:18                                                   kb  

                                                                                             

17:57 Order name: Urine Dipstick-Ancillary (obtain specimen); Complete Time: 18:18            kb  

                                                                                             

18:12 Order name:  OB Limited; Complete Time: 19:25                                         kb  

                                                                                                  

Administered Medications:                                                                         

No medications were administered                                                                  

                                                                                                  

                                                                                                  

Disposition:                                                                                      

19 19:20 Discharged to Home. Impression: 12 weeks gestation of pregnancy.                   

- Condition is Stable.                                                                            

- Discharge Instructions: First Trimester of Pregnancy, Easy-to-Read.                             

                                                                                                  

- Medication Reconciliation Form, Thank You Letter, Antibiotic Education, Prescription            

  Opioid Use form.                                                                                

- Follow up: Emergency Department; When: As needed; Reason: Worsening of condition.               

  Follow up: Private Physician; When: 2 - 3 days; Reason: Recheck today's complaints,             

  Continuance of care, Re-evaluation by your physician.                                           

                                                                                                  

                                                                                                  

                                                                                                  

Signatures:                                                                                       

Dispatcher [employer]                           EDMeron Cox, Tab Benjamin RN                         RN   la1                                                  

Mel Hartmann RN                        RN   ca1                                                  

                                                                                                  

Corrections: (The following items were deleted from the chart)                                    

19:29 19:20 2019 19:20 Discharged to Home. Impression: 12 weeks gestation of pregnancy. ca1 

      Condition is Stable. Forms are Medication Reconciliation Form, Thank You Letter,            

      Antibiotic Education, Prescription Opioid Use. Follow up: Emergency Department; When:       

      As needed; Reason: Worsening of condition. Follow up: Private Physician; When: 2 - 3        

      days; Reason: Recheck today's complaints, Continuance of care, Re-evaluation by your        

      physician. kb                                                                               

                                                                                                  

**************************************************************************************************

## 2019-08-21 NOTE — ER
Nurse's Notes                                                                                     

 [facility]                                                                 

Name: Dipti Donovan                                                                               

Age: 28 yrs                                                                                       

Sex: Female                                                                                       

: 1990                                                                                   

MRN: [id number]                                                                                   

Arrival Date: 2019                                                                          

Time: 17:49                                                                                       

Account#: [id number]                                                                            

Bed 23                                                                                            

Private MD:                                                                                       

Diagnosis: 12 weeks gestation of pregnancy                                                        

                                                                                                  

Presentation:                                                                                     

                                                                                             

17:53 Presenting complaint: Patient states: I have been having abd cramping since I found out la1 

      I was pregnant and also having a lime green colored discharge for the last few days. Pt     

      denies vaginal bleeding. Transition of care: patient was not received from another          

      setting of care. Onset of symptoms was 2019. Risk Assessment: Do you want to     

      hurt yourself or someone else? Patient reports no desire to harm self or others.            

      Initial Sepsis Screen: Does the patient meet any 2 criteria? No. Patient's initial          

      sepsis screen is negative. Does the patient have a suspected source of infection? No.       

      Patient's initial sepsis screen is negative. Care prior to arrival: None.                   

17:53 Method Of Arrival: Ambulatory                                                           la1 

17:53 Acuity: MATTHEW 3                                                                           la1 

                                                                                                  

OB/GYN:                                                                                           

17:54  8, Full Term 5,  2                                                      la1 

17:55 LMP 2019                                                                            la1 

19:19  8,  2, Living 4, LMP 2019                                           kb  

                                                                                                  

Historical:                                                                                       

- Allergies:                                                                                      

17:54 No Known Allergies;                                                                     la1 

- Home Meds:                                                                                      

17:54 Prenatal Vitamin Oral tab 1 tab once daily [Active];                                    la1 

- PMHx:                                                                                           

17:54 None;                                                                                   la1 

- PSHx:                                                                                           

17:54 D \T\ C;                                                                                  la1

                                                                                                  

- Immunization history:: Adult Immunizations up to date.                                          

- Social history:: Smoking status: Patient/guardian denies using tobacco.                         

- Ebola Screening: : No symptoms or risks identified at this time.                                

                                                                                                  

                                                                                                  

Screenin:05 Abuse screen: Denies threats or abuse. Denies injuries from another. Nutritional        ca1 

      screening: No deficits noted. Tuberculosis screening: No symptoms or risk factors           

      identified. Fall Risk IV access (20 points).                                                

                                                                                                  

Assessment:                                                                                       

18:05 General: Appears in no apparent distress. comfortable, Behavior is calm, cooperative,   ca1 

      appropriate for age. Pain: Complains of pain in suprapubic area, right lower quadrant       

      and left lower quadrant Pain radiates to low back area Pain currently is 4 out of 10 on     

      a pain scale. Quality of pain is described as crampy, Pain began since the pregnancy Is     

      intermittent. Neuro: Level of Consciousness is awake, alert, obeys commands, Oriented       

      to person, place, time, situation. Cardiovascular: Heart tones S1 S2 present Capillary      

      refill < 3 seconds Patient's skin is warm and dry. Respiratory: Airway is patent            

      Respiratory effort is even, unlabored, Respiratory pattern is regular, symmetrical,         

      Breath sounds are clear bilaterally. GI: Abdomen is flat, non-distended, Bowel sounds       

      present X 4 quads. Abd is soft and non tender X 4 quads. : Parent/caregiver report        

      the patient having discharge from vagina that is white, yellow, since 4 days ago. EENT:     

      No deficits noted. No signs and/or symptoms were reported regarding the EENT system.        

      Derm: Skin is intact, is healthy with good turgor, Skin is pink, warm \T\ dry.              

      Musculoskeletal: Circulation, motion, and sensation intact. Capillary refill < 3            

      seconds, Range of motion: intact in all extremities.                                        

19:27 Reassessment: Patient appears in no apparent distress at this time. Patient is alert,   ca1 

      oriented x 3, equal unlabored respirations, skin warm/dry/pink.                             

                                                                                                  

Vital Signs:                                                                                      

17:54  / 90; Pulse 78; Resp 18; Temp 97.6; Pulse Ox 98% on R/A; Weight 59.42 kg; Height la1 

      5 ft. 1 in. (154.94 cm);                                                                    

18:05  / 87; Pulse 98; Resp 16 S; Pulse Ox 99% on R/A;                                  ca1 

19:27  / 81; Pulse 95; Resp 16; Pulse Ox 99% on R/A;                                    ca1 

17:54 Body Mass Index 24.75 (59.42 kg, 154.94 cm)                                             la1 

                                                                                                  

ED Course:                                                                                        

17:49 Patient arrived in ED.                                                                  mr  

17:53 Triage completed.                                                                       la1 

17:54 Arm band placed on left wrist.                                                          la1 

17:56 [facility]                                                        kb  

17:56 Parish Meek MD is Attending Physician.                                                kb  

18:17 Bed in low position. Call light in reach. Side rails up X 1. Verbal reassurance given.  jp3 

      Pulse ox on. NIBP on.                                                                       

18:17 Initial lab(s) drawn, by me, sent to lab. Urine collected: clean catch specimen, clear, jp3 

      roxanna colored. Inserted saline lock: 20 gauge in right antecubital area, using aseptic      

      technique. Blood collected. Patient maintains SpO2 saturation greater than 95% on room      

      air.                                                                                        

18:17 Urine Dipstick--Ancillary (enter results) Sent.                                         jp3 

18:33 Mel Hartmann, RN is Primary Nurse.                                                      ca1 

19:01 US OB Limited In Process Unspecified.                                                   EDMS

19:28 No provider procedures requiring assistance completed. IV discontinued, intact,         ca1 

      bleeding controlled, No redness/swelling at site. Pressure dressing applied.                

                                                                                                  

Administered Medications:                                                                         

No medications were administered                                                                  

                                                                                                  

                                                                                                  

Outcome:                                                                                          

19:20 Discharge ordered by MD.                                                                kb  

19:28 Discharged to home ambulatory.                                                          ca1 

19:28 Condition: stable                                                                           

19:28 Discharge instructions given to patient, Instructed on discharge instructions, follow       

      up and referral plans. Demonstrated understanding of instructions, follow-up care.          

19:29 Patient left the ED.                                                                    ca1 

                                                                                                  

Signatures:                                                                                       

Dispatcher [employer]                           EDMS                                                 

Meron Deleon, MACARENAC                 FNP-Lizzette Suh                                                                                    

Tab Finn, RN                         RN   Wm Clemons                              jp3                                                  

Mel Hartmann, RN                        RN   ca1                                                  

                                                                                                  

**************************************************************************************************

## 2019-08-21 NOTE — RAD REPORT
EXAM DESCRIPTION:  US - OB Limited - 8/21/2019 6:57 pm

 

CLINICAL HISTORY:  Pregnancy with abdominal pain

 

COMPARISON:  None

 

FINDINGS:  The uterus measures 12 x 6 x 7 centimeters. Intrauterine pregnancy in transverse presentat
ion. Cardiac activity 161 beats per minute. The amniotic fluid is within normal limits. Placenta is a
nterior.

 

Crown-rump length 6.3 centimeters

 

Right and left ovary normal in size and echotexture. Right and left adnexum unremarkable.

 

No significant free fluid

 

IMPRESSION:  Single live intrauterine pregnancy in transverse presentation

 

The estimated gestational age 12 weeks 5 days OSBALDO 02/28/2020. If a fetal survey is desired it should 
be performed in about 6 weeks

## 2020-03-03 ENCOUNTER — HOSPITAL ENCOUNTER (EMERGENCY)
Dept: HOSPITAL 97 - ER | Age: 30
Discharge: HOME | End: 2020-03-03
Payer: MEDICAID

## 2020-03-03 DIAGNOSIS — J06.9: Primary | ICD-10-CM

## 2020-03-03 PROCEDURE — 87081 CULTURE SCREEN ONLY: CPT

## 2020-03-03 PROCEDURE — 87070 CULTURE OTHR SPECIMN AEROBIC: CPT

## 2020-03-03 PROCEDURE — 87804 INFLUENZA ASSAY W/OPTIC: CPT

## 2020-03-03 PROCEDURE — 99282 EMERGENCY DEPT VISIT SF MDM: CPT

## 2020-03-03 NOTE — EDPHYS
Physician Documentation                                                                           

 [facility]                                                                 

Name: Dipti Donovan                                                                               

Age: 29 yrs                                                                                       

Sex: Female                                                                                       

: 1990                                                                                   

MRN: [id number]                                                                                   

Arrival Date: 2020                                                                          

Time: 20:25                                                                                       

Account#: [id number]                                                                            

Bed 28                                                                                            

Private MD:                                                                                       

ED Physician Parish Meek                                                                         

HPI:                                                                                              

                                                                                             

21:10 This 29 yrs old  Female presents to ER via Ambulatory with complaints of Flu    cp  

      Symptoms.                                                                                   

21:10 The patient or guardian reports cough, that is intermittent.                            cp  

21:10 Onset: The symptoms/episode began/occurred yesterday. Severity of symptoms: in the      cp  

      emergency department the symptoms are unchanged, despite home interventions. Associated     

      signs and symptoms: Pertinent positives: sore throat started 3 days ago, Pertinent          

      negatives: chest pain, diarrhea, ear ache, fever, vomiting.                                 

                                                                                                  

OB/GYN:                                                                                           

20:37 LMP N/A - Recent birth                                                                  iw  

                                                                                                  

Historical:                                                                                       

- Allergies:                                                                                      

20:37 No Known Allergies;                                                                     iw  

- Home Meds:                                                                                      

20:37 Prenatal Vitamin Oral tab 1 tab once daily [Active];                                    iw  

- PMHx:                                                                                           

20:37 None;                                                                                   iw  

- PSHx:                                                                                           

20:37 Tubal ligation;                                                                         iw  

                                                                                                  

- Immunization history:: Adult Immunizations not up to date.                                      

- Social history:: Smoking status: Patient denies any tobacco usage or history of.                

                                                                                                  

                                                                                                  

ROS:                                                                                              

21:15 Constitutional: Positive for body aches, Negative for fever, poor PO intake.            cp  

21:15 Eyes: Negative for injury, pain, redness, and discharge.                                cp  

21:15 ENT: Positive for sore throat, Negative for drainage from ear(s), ear pain, difficulty      

      swallowing, difficulty handling secretions.                                                 

21:15 Cardiovascular: Negative for chest pain, palpitations.                                      

21:15 Respiratory: Positive for cough, Negative for shortness of breath, wheezing.                

21:15 Abdomen/GI: Negative for abdominal pain, vomiting, diarrhea, constipation.                  

21:15 Skin: Negative for rash.                                                                    

21:15 Neuro: Negative for altered mental status, headache, syncope, weakness.                     

21:15 All other systems are negative.                                                             

                                                                                                  

Exam:                                                                                             

21:25 Constitutional: The patient appears in no acute distress, alert, awake, non-toxic, well cp  

      developed, well nourished.                                                                  

21:25 Head/Face:  Normocephalic, atraumatic.                                                  cp  

21:25 Eyes: Periorbital structures: appear normal, Conjunctiva: normal, no exudate, no            

      injection, Lids and lashes: appear normal, bilaterally.                                     

21:25 ENT: External ear(s): are unremarkable, Ear canal(s): are normal, clear, TM's: bulging,     

      is not appreciated, bilaterally, dullness, bilaterally, erythema, is not appreciated,       

      bilaterally, Nose: is normal, Mouth: Lips: moist, Oral mucosa: pink and intact, moist,      

      Posterior pharynx: Airway: no evidence of obstruction, patent, Tonsils: are normal in       

      appearance, swelling, is not appreciated, erythema, is not appreciated, exudate, is not     

      appreciated.                                                                                

21:25 Neck: ROM/movement: is normal, is supple, without pain, no range of motions                 

      limitations, no meningismus, Lymph nodes: no appreciated lymphadenopathy.                   

21:25 Chest/axilla: Inspection: normal, Palpation: is normal, no crepitus, no tenderness.         

21:25 Cardiovascular: Rate: tachycardic, Rhythm: regular.                                         

21:25 Respiratory: the patient does not display signs of respiratory distress,  Respirations:     

      normal, no use of accessory muscles, no retractions, no tachypnea, labored breathing,       

      is not present, Breath sounds: are clear throughout, no decreased breath sounds, no         

      stridor, no wheezing.                                                                       

21:25 Abdomen/GI: Exam negative for discomfort, distension, guarding, Inspection: abdomen         

      appears normal.                                                                             

21:25 Skin: no rash present.                                                                      

                                                                                                  

Vital Signs:                                                                                      

20:34  / 68; Pulse 117; Resp 16 S; Temp 99.4; Pulse Ox 98% on R/A; Weight 67.13 kg;     iw  

      Height 5 ft. 1 in. (154.94 cm); Pain 4/10;                                                  

20:34 Body Mass Index 27.96 (67.13 kg, 154.94 cm)                                             iw  

                                                                                                  

MDM:                                                                                              

20:36 Patient medically screened.                                                             cp  

22:00 Differential Diagnosis: Bronchitis Influenza Upper Respiratory Infection Otitis Media   cp  

      Viral Syndrome Pneumonia Other strep throat.                                                

22:45 Data reviewed: vital signs, nurses notes, lab test result(s), and as a result, I will   cp  

      discharge patient.                                                                          

22:45 Counseling: I had a detailed discussion with the patient and/or guardian regarding: the cp  

      historical points, exam findings, and any diagnostic results supporting the                 

      discharge/admit diagnosis, lab results, to return to the emergency department if            

      symptoms worsen or persist or if there are any questions or concerns that arise at home.    

                                                                                                  

                                                                                             

21:04 Order name: Strep                                                                       cp  

                                                                                             

21:04 Order name: Influenza Screen (a \T\ B)                                                    cp

                                                                                             

22:44 Order name: Throat Culture                                                              EDMS

                                                                                                  

Administered Medications:                                                                         

No medications were administered                                                                  

                                                                                                  

                                                                                                  

Disposition:                                                                                      

                                                                                             

03:12 Co-signature as Attending Physician, Parish Meek MD.                                    rn  

                                                                                                  

Disposition:                                                                                      

20 22:46 Discharged to Home. Impression: Acute upper respiratory infection, unspecified.    

- Condition is Stable.                                                                            

- Discharge Instructions: Upper Respiratory Infection, Adult.                                     

                                                                                                  

- Medication Reconciliation Form, Thank You Letter, Antibiotic Education, Prescription            

  Opioid Use form.                                                                                

- Follow up: Private Physician; When: 2 - 3 days; Reason: Worsening of condition.                 

- Problem is new.                                                                                 

- Symptoms are unchanged.                                                                         

                                                                                                  

                                                                                                  

                                                                                                  

Signatures:                                                                                       

Dispatcher [employer]                           EDBeckie Ruby RN RN iw Nieto, Roman, MD MD rn Page, Corey, PA                         PA   Sonali Morley RN                      RN   [id number]                                                  

                                                                                                  

Corrections: (The following items were deleted from the chart)                                    

                                                                                             

23:23 22:46 2020 22:46 Discharged to Home. Impression: Acute upper respiratory          jv1 

      infection, unspecified. Condition is Stable. Forms are Medication Reconciliation Form,      

      Thank You Letter, Antibiotic Education, Prescription Opioid Use. Follow up: Private         

      Physician; When: 2 - 3 days; Reason: Worsening of condition. Problem is new. Symptoms       

      are unchanged. cp                                                                           

                                                                                                  

**************************************************************************************************
Good (>75%)

## 2020-03-03 NOTE — XMS REPORT
Summary of Care

 Created on:2020



Patient:Dipti Donovan

Sex:Female

:1990

External Reference #:[id number]





Demographics







 Address  [address]



   [place] [address]

 

 Mobile Phone  [phone]

 

 Phone  [phone]

 

 Home Phone  [phone]

 

 Email Address  [email or web address]

 

 Preferred Language  English

 

 Marital Status  

 

 Yazidism Affiliation  Unknown

 

 Race  White

 

 Ethnic Group   or 









Author







 Organization  [facility]

 

 Address  [address] [place] [address]









Support







 Name  Relationship  Address  Phone

 

 Albert Khoury  Unavailable  [address]  [phone]



     [place] [address]  









Care Team Providers







 Name  Role  Phone

 

 Cecilio Singh FNP  Primary Care Provider  [phone]









Reason for Visit







 Reason  Comments

 

 Prenatal Care  







Encounter Details







 Date  Type  Department  Care Team  Description

 

 2020  Routine Prenatal  [facility]-  Cecilio Singh  
Supervision of high risk pregnancy, antepartum (Primary Dx);



   Visit  SALINAS Aguirre



  History of  delivery, currently pregnant;



     [address]



  [address]  Grand [address]



     [facility]



  



     [address]



  [place], [place]  



     [phone] [address] [phone]  



       (Fax)  







Allergies

No Known Allergiesdocumented as of this encounter (statuses as of 2020)



Medications







 Medication  Sig  Dispensed  Refills  Start Date  End Date  Status

 

 prenatal vit  Take 1 Packet by  30 Each  6  2019    Active



 33-iron-folic-dha  mouth daily.          



 (SELECT-OB + DHA) 29 mg            



 iron-1 mg -250 mg combo            



 packIndications:            



 Supervision of high            



 risk pregnancy,            



 antepartum            



documented as of this encounter (statuses as of 2020)



Active Problems







 Problem  Noted Date

 

 Death of child  2020

 

 Maternal varicella, non-immune  2019









 Overview: 







 Address in Postpartum









 Supervision of high risk pregnancy, antepartum  2019

 

 Grand multiparity  2016









 Overview: 







 Plans IUD pp









 History of  delivery, currently pregnant  2016









 Pregnant  Estimated Date of Delivery  Comments

 

 Yes  2020  Based on last menstrual period of 2019 (Exact



     Date)



documented as of this encounter (statuses as of 2020)



Resolved Problems







 Problem  Noted Date  Resolved Date

 

 24 weeks gestation of pregnancy  2019

 

 Vaginal discharge  2019

 

 History of urinary retention  10/11/2018  2019









 Overview: 







 After delivery









 Dysuria  10/11/2018  2019

 

 Normal vaginal delivery  2016  10/11/2018

 

 Urinary retention with incomplete bladder emptying  2016  10/11/2018









 Overview: 



Pt requiring Casas cath after delivery x 24 hrs, states occurred after last 
delivery also









 37 weeks gestation of pregnancy  2016  10/11/2018

 

 Insufficient prenatal care, third trimester  2016

 

 Threatened labor at term  2016

 

 Indication for care or intervention in labor or delivery-Active  2016  10
/2018



 Labor at Term    

 

 Antepartum anemia in third trimester  2016  10/11/2018

 

 Pelvic pressure in pregnancy, antepartum, third trimester  2016

 

 History of spontaneous , currently pregnant, third  2016



 trimester    

 

 History of anemia  2016

 

 Supervision of high-risk pregnancy with insufficient prenatal  2016



 care in third trimester    



documented as of this encounter (statuses as of 2020)



Immunizations







 Name  Administration Dates  Next Due

 

 TDAP (ADACEL) VACCINE  2019  

 

 Tdap  2016  



documented as of this encounter



Social History







 Tobacco Use  Types  Packs/Day  Years Used  Date

 

 Never Smoker        









 Smokeless Tobacco: Never Used      









 Comments: not a smoker









 Alcohol Use  Drinks/Week  oz/Week  Comments

 

 No  0 Standard drinks or equivalent  0.0  









 Pregnant  Estimated Date of Delivery  Comments

 

 Yes  2020  Based on last menstrual period of 2019 (Exact



     Date)









 Sex Assigned at Birth  Date Recorded

 

 Not on file  









 Job Start Date  Occupation  Industry

 

 Not on file  Not on file  Not on file









 Travel History  Travel Start  Travel End









 No recent travel history available.



documented as of this encounter



Last Filed Vital Signs







 Vital Sign  Reading  Time Taken  Comments

 

 Blood Pressure  102/62  2020  1:38 PM CST  

 

 Pulse  89  2020  1:38 PM CST  

 

 Temperature  36.7 C (98 F)  2020  1:38 PM CST  

 

 Respiratory Rate  16  2020  1:38 PM CST  

 

 Oxygen Saturation  -  -  

 

 Inhaled Oxygen Concentration  -  -  

 

 Weight  71.8 kg (158 lb 4 oz)  2020  1:38 PM CST  

 

 Height  154.9 cm (5' 1")  2020  1:38 PM CST  

 

 Body Mass Index  29.9  2020  1:38 PM CST  



documented in this encounter



Progress Notes

Cecilio Singh, DALTONP - 2020  1:45 PM CSTFormatting of this note might 
be different from the original.

Chief complaint:

Chief Complaint

Patient presents with

 Prenatal Care



HPI CC: Follow Up Prenatal Visit



Dipti Donovan is a 29 year old, , /White female. Patient's last 
menstrual period was2019 (exact date).  She is 36w1d with an intrauterine 
pregnancy.  Her estimated date of delivery is 2020, by Last Menstrual 
Period.  She has no complaints today. She reports +FM and denies contractions, 
LOF and bleeding today. Reviewed OB/ER, PIH, labor and movement precautions. 
Encouraged patient to go to [facility] for any signs and symptoms  of labor (regular 
contractions, LOF, vaginal bleeding or decrease fetal movement. Patient denies 
current or past physical, sexual or emotional abuse.



Histories

OB History

 Para Term  AB Living

9 5 4 1 2 4

SAB TAB Ectopic Multiple Live Births

2 0 0 0 5



# Outcome Date GA Lbr Chiki/2nd Weight Sex Delivery Anes PTL Lv

9 Current

8 2018 13w0d

7 Term 16 37w6d  6 lb 12 oz (3.062 kg) F VAGINAL   PRATIBHA

6 Term 04/05/15 39w0d  6 lb 12 oz (3.062 kg) F NORMAL SPONT EPI  PRATIBHA

5  14 34w0d  4 lb 2 oz (1.871 kg) F NORMAL SPONT   PRATIBHA

   Complications: Placenta Previa

4 Term 13 39w0d  6 lb 12 oz (3.062 kg) F NORMAL SPONT EPI  PRATIBHA

3 Term 12 39w0d  7 lb 4 oz (3.289 kg) F NORMAL SPONT EPI  ND

2 2009 12w0d

1 



Past Medical History:

Diagnosis Date

 Anemia

 ongoing, taking supplements

 Grand multiparity 2016

 Plans IUD pp

 Heart murmur

 resolved, had a heart murmur as a child

 History of anemia 2016

 Indication for care or intervention in labor or delivery-Active Labor at 
Term 2016

 Normal vaginal delivery 2016

 Transfusion history 2012, (2)

 Urinary retention with incomplete bladder emptying 2016

 Pt requiring Casas cath after delivery x 24 hrs, states occurred after last 
delivery also



Family History

Problem Relation Age of Onset

 No Significant Medical Problems Mother

 No Significant Medical Problems Father

 No Significant Medical Problems Sister

 No Significant Medical Problems Brother

 No Significant Medical Problems Maternal Aunt

 No Significant Medical Problems Maternal Uncle

 No Significant Medical Problems Paternal Aunt

 No Significant Medical Problems Paternal Uncle

 No Significant Medical Problems Maternal Grandmother

 No Significant Medical Problems Maternal Grandfather

 No Significant Medical Problems Paternal Grandmother

 No Significant Medical Problems Paternal Grandfather

 Arthritis NoFHx

 Asthma NoFHx

 Birth defects NoFHx

 Breast Cancer NoFHx

 Colon Cancer NoFHx

 Ovarian Cancer NoFHx

 Uterine Cancer NoFHx

 Cancer NoFHx

 Depression NoFHx

 Diabetes NoFHx

 Genetic NoFHx

 Heart NoFHx

 High cholesterol NoFHx

 Hypertension NoFHx

 Mental retardation NoFHx

 Neurological NoFHx

 Osteoporosis NoFHx

 Psychiatry NoFHx

 Other - see comments NoFHx



Family Status

Relation Name Status

 Mo  Alive

 Fa  Alive

 Sis  Alive

 Bro  Alive

 MAunt  (Not Specified)

 MUnc  (Not Specified)

 PAunt  (Not Specified)

 PUnc  (Not Specified)

 MGMo  Alive

 MGFa  Alive

 PGMo  Alive

 PGFa  Alive

 NoFHx  (Not Specified)



Past Surgical History:

Procedure Laterality Date

 DELIVER PLACENTA  2016



 DILATION AND CURETTAGE (SHX)  

 remove left over placentae

 FETAL MONITOR W/REPORT  2016



 OBSTETRICAL CARE,VAG DELIV ONLY  2016



 OXYTOCIN INDUCTION/AUGMENTATION REFERENCE LINK ORDER  2016



 REPAIR VAGINA/PERINEUM  2016





Social History



Socioeconomic History

 Marital status: 

  Spouse name: Not on file

 Number of children: 3

 Years of education: Not on file

 Highest education level: Not on file

Occupational History

 Occupation: none

Social Needs

 Financial resource strain: Not on file

 Food insecurity:

  Worry: Not on file

  Inability: Not on file

 Transportation needs:

  Medical: Not on file

  Non-medical: Not on file

Tobacco Use

 Smoking status: Never Smoker

 Smokeless tobacco: Never Used

 Tobacco comment: not a smoker

Substance and Sexual Activity

 Alcohol use: No

  Alcohol/week: 0.0 standard drinks

 Drug use: No

 Sexual activity: Yes

  Partners: Male

  Birth control/protection: None

  Comment: last sexual intercourse 2019

Lifestyle

 Physical activity:

  Days per week: Not on file

  Minutes per session: Not on file

 Stress: Not on file

Relationships

 Social connections:

  Talks on phone: Not on file

  Gets together: Not on file

  Attends Church service: Not on file

  Active member of club or organization: Not on file

  Attends meetings of clubs or organizations: Not on file

  Relationship status: Not on file

 Intimate partner violence:

  Fear of current or ex partner: Not on file

  Emotionally abused: Not on file

  Physically abused: Not on file

  Forced sexual activity: Not on file

Other Topics Concern

  Service Not Asked

 Blood Transfusions Yes

 Caffeine Concern Not Asked

 Occupational Exposure Not Asked

 Hobby Hazards Not Asked

 Sleep Concern Not Asked

 Stress Concern Not Asked

 Weight Concern Not Asked

 Special Diet Not Asked

 Back Care Not Asked

 Exercise Not Asked

 Bike Helmet Not Asked

 Seat Belt Not Asked

 Self-Exams Not Asked

Social History Narrative

 Pt states her Church preference is Shinto

 Patient lives with four daughters and a friend.

 Patient has no cats in the home.



Social History



Substance and Sexual Activity

Sexual Activity Yes

 Partners: Male

 Birth control/protection: None

 Comment: last sexual intercourse 2019







Labs

Labs are pending.



Radiology

No new radiology.



Allergies

Dipti has No Known Allergies.



Medications

Dipti has a current medication list which includes the following prescription(
s): prenatal vit 33-iron-folic-dha.



Review of Systems

Constitutional: Negative for activity change, appetite change, fatigue, 
unexpected weight change, weight gain and weight loss.

HENT: Negative for sore throat.

Eyes: Negative for visual disturbance.

Respiratory: Negative for cough and shortness of breath.

Breasts: Negative for discharge, mass, pain and unequal size.

Cardiovascular: Negative for chest pain, palpitations and leg swelling.

Gastrointestinal: Negative.  Negative for abdominal pain, anal bleeding, blood 
in stool, constipation, diarrhea, nausea, rectal pain and vomiting.

Genitourinary: Negative for bladder incontinence, dysuria, urgency, flank pain, 
vaginal bleeding, vaginal discharge, genital sores, vaginal pain and pelvic 
pain.

Skin: Negative for color change and rash.

Neurological: Negative.  Negative for dizziness, syncope and headaches.

Psychiatric/Behavioral: Negative for confusion, self-injury and sleep 
disturbance. The patient is not nervous/anxious.

Hematological: Negative for cold intolerance and heat intolerance.

Endocrine: Negative for hair loss, cold intolerance, heat intolerance, weight 
gain and weight loss.



/62 (BP Location: Right arm, Patient Position: Sitting, BP CUFF SIZE: 
Adult Medium)  | Pulse 89  | Temp 36.7 C (98 F) (Oral)  | Resp 16  | Ht 5' 1
" (1.549 m)  | Wt 158 lb 4 oz (71.8 kg)  | LMP 2019 (Exact Date)  | BMI 
29.90 kg/m

Pregravid BMI: Could not be calculated



Physical Exam

Vitals reviewed.

Constitutional: She is oriented to person, place, and time. She appears well-
developed and well-nourished. Her body habitus is normal.

Cardiovascular: Regular rate and rhythm. No peripheral edema present.

Pulmonary/Chest: Normal inspiratory effort.

Neuro/Psychiatric: Inappropriate mood and affect. She is oriented to person, 
place, and time.

Skin: Skin normal. No lesion, no rash and no ulceration present.



PRENATAL PHYSICAL:

General Exam:

Neurological: Normal

Abdomen: Normal

Extremities: Normal



Pelvic Exam:

Vagina:

    Chaperone present for the exam: Sneha Stinson RN

Cervix:

    Fingertip/50/-3

Membrane status: Intact

Uterus: 35cm  Weeks





Assessment/Plan

Supervision of high risk pregnancy, antepartum  (primary encounter diagnosis)

History of  delivery, currently pregnant

Grand multiparity

Comment: Routine Prenatal Visit

Plan: CBC WITH DIFF, GROUP B STREPTOCOCCUS BY PCR,

      CBC WITH DIFFERENTIAL, POCT URINALYSIS W/O

      SPECIFIC GRAVITY

      Denies zika virus risk, signs and symptoms such as fever,rash,joint pain, 
conjunctivitis (red eyes), muscle pain, headaches; outside [place] travel to areas 
affected by zika, and FOB exposure to zika.Educated on use of mosquito 
repellent.



Return to clinic in 2 weeks.

Discussed treatment options.

Medications as ordered.

Reviewed patient instructions and provided printed copy.



This visit did not involve counseling and coordination that comprised more than 
50% of the visit time.

SALINAS Joshi  2020  1:57 PM

Electronically signed by Cecilio Singh FNP at 2020  1:57 PM 
CSTdocumented in this encounter



Plan of Treatment







 Date  Type  Specialty  Care Team  Description

 

 2020  Routine Prenatal Visit  [facility]  Cecilio Singh, FNP



  



       [address] [place] [address]



  



       [phone] (Fax)  









 Name  Type  Priority  Associated Diagnoses  Date/Time

 

 CBC WITH DIFF  LAB  Routine  Supervision of high risk  2020  1:32 PM



       pregnancy, antepartum  CST

 

 GROUP B STREPTOCOCCUS BY  LAB  Routine  Supervision of high risk  2020  1
:32 PM



 PCR      pregnancy, antepartum  CST

 

 CBC WITH DIFFERENTIAL  LAB  Routine  Supervision of high risk  2020  1:
32 PM



       pregnancy, antepartum  CST









 Health Maintenance  Due Date  Last Done  Comments

 

 INFLUENZA VACCINE (#1)  2020    Postponed from 2019



       (Refused)

 

 VARICELLA VACCINES (1 of 2 -  2020    Postponed from 10/28/1991



 2-dose childhood series)      (Pregnant or



       Breastfeeding)

 

 PAP SMEAR  10/11/2021  10/11/2018  

 

 DTaP,Tdap,and Td Vaccines (3  2029,  



 - Td)    2016  

 

 PNEUMOCOCCAL 0-64 YEARS  Aged Out    No longer eligible based



 COMBINED SERIES      on patient's age to



       complete this topic



documented as of this encounter



Procedures







 Procedure Name  Priority  Date/Time  Associated Diagnosis  Comments

 

 POCT URINALYSIS W/O  Routine  2020  1:41  Supervision of high  Results 
for this



 SPECIFIC GRAVITY    PM CST  risk pregnancy,  procedure are in



       antepartum  the results



         section.



documented in this encounter



Results

POCT URINALYSIS W/O SPECIFIC GRAVITY (2020  1:41 PM CST)





 Component  Value  Ref Range  Performed At  Pathologist Signature

 

 POCT PH U  7  5 - 8 mg/dl    

 

 POCT U LEUK EST  2+  Negative - Negative    

 

 POCT U NIT  neg  Negative - Negative    

 

 POCT U PROT  trace  Negative - Negative    

 

 POCT U GLU  neg  Negative - Negative    

 

 POCT U KETONE  neg  Negative - Negative    

 

 POCT U BLD  neg  Negative - Negative    









 Specimen

 

 Urine - URINE, CLEAN CATCH



documented in this encounter



Visit Diagnoses







 Diagnosis

 

 Supervision of high risk pregnancy, antepartum - Primary

 

 History of  delivery, currently pregnant







 Pregnancy with history of pre-term labor

 

 Grand multiparity



documented in this encounter



Insurance







 Payer  Benefit Plan /  Subscriber ID  Effective  Phone  Address  Type



   Group    Dates      

 

 [facility]  xxxxxxxxx  2019-Pres    [address]  Medicaid



 HEALTH CHOICE -  HEALTH CHOICE    ent    [id number]



  



 MANAGED MEDICAID [place]          [id number]  









 Guarantor Name  Account Type  Relation to  Date of Birth  Phone  Billing



     Patient      Address

 

 Dipti Donovan  Personal/Family  Self  1990  [phone]  [address] [place] [address]



         (Home)



  [address]







         [phone]  [place],



         ([place])  [place] [address]



documented as of this encounter

## 2020-03-03 NOTE — XMS REPORT
Summary of Care

 Created on:February 3, 2020



Patient:Dipti Donovan

Sex:Female

:1990

External Reference #:[id number]





Demographics







 Address  [address] [address]



   [place] [address]

 

 Mobile Phone  [phone]

 

 Phone  [phone]

 

 Home Phone  [phone]

 

 Email Address  [email or web address]

 

 Preferred Language  English

 

 Marital Status  

 

 Congregation Affiliation  Unknown

 

 Race  White

 

 Ethnic Group   or 









Author







 Organization  [facility]

 

 Address  [address] [place] [address]









Support







 Name  Relationship  Address  Phone

 

 Albert Khanzondo  Unavailable  [address]  [phone]



     [place] [address]  









Care Team Providers







 Name  Role  Phone

 

 Cecilio Singh FNP  Primary Care Provider  [phone]









Reason for Visit







 Reason  Comments

 

 Other  needs breast pump







Encounter Details







 Date  Type  Department  Care Team  Description

 

 2020  Telephone  [facility]-  Cecilio Singh,  Other (needs 
breast



     [place]



  FNP



  pump)



     [address] [facility]



  [address] [place], [place] [address] [phone] (Fax)  







Allergies

No Known Allergiesdocumented as of this encounter (statuses as of 2020)



Medications







 Medication  Sig  Dispensed  Refills  Start Date  End Date  Status

 

 prenatal vitamin w/FA  Take 1 tablet by  100 tablet  3  2020    Active



 tabletIndications:  mouth daily.          



  (spontaneous            



 vaginal delivery)            

 

 docusate calcium 240  Take 1 capsule by  60 capsule  1  2020    Active



 mg  mouth once daily          



 capsuleIndications:  as needed for          



  (spontaneous  Constipation.          



 vaginal delivery)            

 

 ferrous sulfate 325  Take 1 tablet by  60 tablet  2  2020    Active



 mg (65 mg iron)  mouth 2 (two)          



 tabletIndications:  times daily.          



  (spontaneous            



 vaginal delivery)            

 

 ibuprofen 600 mg  Take 1 tablet by  60 tablet  1  2020    Active



 tabletIndications:  mouth every 6          



  (spontaneous  (six) hours as          



 vaginal delivery)  needed (Pain).          



   Take with food or          



   milk.          

 

 HYDROcodone-acetamino  Take 1 tablet by  5 tablet  0  2020    Active



 phen 5-325 mg  mouth every 6          



 tabletIndications:  (six) hours as          



 S/P tubal ligation  needed for Pain          



   (scale 7-10).          



documented as of this encounter (statuses as of 2020)



Active Problems







 Problem  Noted Date

 

  (spontaneous vaginal delivery)  2020

 

 Anemia, postpartum  2020

 

 S/P tubal ligation  2020

 

 Single live   2020

 

 38 weeks gestation of pregnancy  2020

 

 Decreased fetal movements in third trimester  2020

 

 IUGR (intrauterine growth restriction) affecting care of mother, third  2020



 trimester, not applicable or unspecified fetus  

 

 Decreased fetal movement affecting management of pregnancy in third  2020



 trimester, not applicable or unspecified fetus  

 

 Obesity (BMI 30-39.9)  2020

 

 Death of child  2020

 

 Maternal varicella, non-immune  2019









 Overview: 







 Address in Postpartum









 Supervision of high risk pregnancy in third trimester  2019

 

 Grand multiparity  2016









 Overview: 







 Plans IUD pp









 History of  delivery, currently pregnant  2016









 Pregnant  Estimated Date of Delivery  Comments

 

 Yes  2020  Based on last menstrual period of 2019 (Exact



     Date)



documented as of this encounter (statuses as of 2020)



Resolved Problems







 Problem  Noted Date  Resolved Date

 

 24 weeks gestation of pregnancy  2019

 

 Vaginal discharge  2019

 

 History of urinary retention  10/11/2018  2019









 Overview: 







 After delivery









 Dysuria  10/11/2018  2019

 

 Normal vaginal delivery  2016  10/11/2018

 

 Urinary retention with incomplete bladder emptying  2016  10/11/2018









 Overview: 



Pt requiring Casas cath after delivery x 24 hrs, states occurred after last 
delivery also









 37 weeks gestation of pregnancy  2016  10/11/2018

 

 Insufficient prenatal care, third trimester  2016

 

 Threatened labor at term  2016

 

 Indication for care or intervention in labor or delivery-Active  2016  10
/2018



 Labor at Term    

 

 Antepartum anemia in third trimester  2016  10/11/2018

 

 Pelvic pressure in pregnancy, antepartum, third trimester  2016

 

 History of spontaneous , currently pregnant, third  2016



 trimester    

 

 History of anemia  2016

 

 Supervision of high-risk pregnancy with insufficient prenatal  2016



 care in third trimester    



documented as of this encounter (statuses as of 2020)



Immunizations







 Name  Administration Dates  Next Due

 

 TDAP (ADACEL) VACCINE  2019  

 

 Tdap  2016  



documented as of this encounter



Social History







 Tobacco Use  Types  Packs/Day  Years Used  Date

 

 Never Smoker        









 Smokeless Tobacco: Never Used      









 Comments: not a smoker









 Alcohol Use  Drinks/Week  oz/Week  Comments

 

 No  0 Standard drinks or equivalent  0.0  









 Pregnant  Estimated Date of Delivery  Comments

 

 Yes  2020  Based on last menstrual period of 2019 (Exact



     Date)









 Sex Assigned at Birth  Date Recorded

 

 Not on file  









 Job Start Date  Occupation  Industry

 

 Not on file  Not on file  Not on file









 Travel History  Travel Start  Travel End









 No recent travel history available.



documented as of this encounter



Last Filed Vital Signs

Not on filedocumented in this encounter



Plan of Treatment







 Date  Type  Specialty  Care Team  Description

 

 2020  Routine Prenatal Visit  [facility]  Cecilio Singh, FNP



  



       [address] [place], [place] [address] [phone] (Fax)  









 Health Maintenance  Due Date  Last Done  Comments

 

 INFLUENZA VACCINE (#1)  2020    Postponed from 2019



       (Refused)

 

 VARICELLA VACCINES (1 of 2 -  2020    Postponed from 10/28/1991



 2-dose childhood series)      (Pregnant or



       Breastfeeding)

 

 PAP SMEAR  10/11/2021  10/11/2018  

 

 DTaP,Tdap,and Td Vaccines (3  2029,  



 - Td)    2016  

 

 PNEUMOCOCCAL 0-64 YEARS  Aged Out    No longer eligible based



 COMBINED SERIES      on patient's age to



       complete this topic



documented as of this encounter



Results

Not on filedocumented in this encounter



Visit Diagnoses







 Diagnosis

 

 Supervision of high risk pregnancy in third trimester - Primary







 Unspecified high-risk pregnancy



documented in this encounter



Insurance







 Payer  Benefit Plan /  Subscriber ID  Effective  Phone  Address  Type



   Group    Dates      

 

 [facility]  xxxxxxxxx  2019-Pres    [address]  Medicaid



 HEALTH CHOICE -  HEALTH CHOICE    ent    [id number]



  



 MANAGED  MEDICAID        [place], [place]  



 MEDICAID          [id number]  



documented as of this encounter

## 2020-03-03 NOTE — ER
Nurse's Notes                                                                                     

 [facility]                                                                 

Name: Dipti Donovan                                                                               

Age: 29 yrs                                                                                       

Sex: Female                                                                                       

: 1990                                                                                   

MRN: [id number]                                                                                   

Arrival Date: 2020                                                                          

Time: 20:25                                                                                       

Account#: [id number]                                                                            

Bed 28                                                                                            

Private MD:                                                                                       

Diagnosis: Acute upper respiratory infection, unspecified                                         

                                                                                                  

Presentation:                                                                                     

                                                                                             

20:34 Chief complaint: Patient states: recently gave birth X 1 month ago, had a sore throat   iw  

      on Saturday, fever and body aches started Monday, also started having chest pain ut         

      thought it was because she stopped breast feeding, also has mild cough and burning with     

      urination X 2 weeks, fever was 102 today took tylenol at 6 pm. Coronavirus screen: The      

      patient has NOT traveled to [place] in the past 14 days. Proceed with normal triage           

      procedures. The patient has NOT had contact with known and/or suspected case of             

      Coronavirus. Proceed with normal triage procedures. Ebola Screen: Patient negative for      

      fever greater than or equal to 101.5 degrees Fahrenheit, and additional compatible          

      Ebola Virus Disease symptoms Patient denies exposure to infectious person. Patient          

      denies travel to an Ebola-affected area in the 21 days before illness onset. No             

      symptoms or risks identified at this time. Initial Sepsis Screen: Does the patient meet     

      any 2 criteria? No. Patient's initial sepsis screen is negative. Does the patient have      

      a suspected source of infection? No. Patient's initial sepsis screen is negative. Risk      

      Assessment: Do you want to hurt yourself or someone else? Patient reports no desire to      

      harm self or others.                                                                        

20:34 Method Of Arrival: Ambulatory                                                           iw  

20:34 Acuity: MATTHEW 3                                                                           iw  

                                                                                                  

Triage Assessment:                                                                                

21:32 General: Appears in no apparent distress. Behavior is calm, cooperative. Pain: Pain     ls4 

      currently is 4 out of 10 on a pain scale.                                                   

                                                                                                  

OB/GYN:                                                                                           

20:37 LMP N/A - Recent birth                                                                  iw  

                                                                                                  

Historical:                                                                                       

- Allergies:                                                                                      

20:37 No Known Allergies;                                                                     iw  

- Home Meds:                                                                                      

20:37 Prenatal Vitamin Oral tab 1 tab once daily [Active];                                    iw  

- PMHx:                                                                                           

20:37 None;                                                                                   iw  

- PSHx:                                                                                           

20:37 Tubal ligation;                                                                         iw  

                                                                                                  

- Immunization history:: Adult Immunizations not up to date.                                      

- Social history:: Smoking status: Patient denies any tobacco usage or history of.                

                                                                                                  

                                                                                                  

Screenin:30 Abuse screen: Denies threats or abuse. Denies injuries from another. Nutritional        ls4 

      screening: No deficits noted. Tuberculosis screening: No symptoms or risk factors           

      identified. Fall Risk None identified.                                                      

                                                                                                  

Vital Signs:                                                                                      

20:34  / 68; Pulse 117; Resp 16 S; Temp 99.4; Pulse Ox 98% on R/A; Weight 67.13 kg;     iw  

      Height 5 ft. 1 in. (154.94 cm); Pain 4/10;                                                  

20:34 Body Mass Index 27.96 (67.13 kg, 154.94 cm)                                               

                                                                                                  

ED Course:                                                                                        

20:25 Patient arrived in ED.                                                                  jg7 

20:28 José Manuel Mensah PA is PHCP.                                                                cp  

20:28 Parish Meek MD is Attending Physician.                                                cp  

20:37 Triage completed.                                                                       iw  

20:37 Arm band placed on.                                                                     iw  

21:30 Milvia Velásquez, RN is Primary Nurse.                                                     ls4 

21:30 Patient has correct armband on for positive identification. Bed in low position. Call   ls4 

      light in reach. Side rails up X 1.                                                          

21:30 No provider procedures requiring assistance completed. Patient did not have IV access   ls4 

      during this emergency room visit.                                                           

22:01 Influenza Screen (a \T\ B) Sent.                                                          ds4

22:01 Strep Sent.                                                                             ds4 

                                                                                                  

Administered Medications:                                                                         

No medications were administered                                                                  

                                                                                                  

                                                                                                  

Outcome:                                                                                          

22:46 Discharge ordered by MD.                                                                cp  

23:23 Patient left the ED.                                                                    jv1 

                                                                                                  

Signatures:                                                                                       

Beckie An, RN                     RN                                                      

Sid Muniz                             ds4                                                  

José Manuel Mensah PA PA cp Vicente, Joyce, RN RN   jv1                                                  

Milvia Velásquez RN                       RN   ls4                                                  

Jocelyn Villanueva                           jg7                                                  

                                                                                                  

**************************************************************************************************

## 2020-03-03 NOTE — XMS REPORT
Summary of Care

 Created on:2020



Patient:Dipti Donovan

Sex:Female

:1990

External Reference #:[id number]





Demographics







 Address  [address]



   [place] [address]

 

 Mobile Phone  [phone]

 

 Phone  [phone]

 

 Home Phone  [phone]

 

 Email Address  [email or web address]

 

 Preferred Language  English

 

 Marital Status  

 

 Lutheran Affiliation  Unknown

 

 Race  White

 

 Ethnic Group   or 









Author







 Organization  [facility]

 

 Address  [address] [place] [address]









Support







 Name  Relationship  Address  Phone

 

 Albert Khoury  Unavailable  [address]  [phone]



     [place] [address]  









Care Team Providers







 Name  Role  Phone

 

 Singh, Cecilio HOFFMAN  Primary Care Provider  [phone]









Encounter Details







 Date  Type  Department  Care Team  Description

 

 2020  Orders Only  [facility]



  Doctor Unassigned, No  



     [address] [facility]



  Name



  



     [place] [address]  [address] [place] [address]  







Allergies

No Known Allergiesdocumented as of this encounter (statuses as of 2020)



Medications







 Medication  Sig  Dispensed  Refills  Start Date  End Date  Status

 

 prenatal vitamin w/FA  Take 1 tablet by  100 tablet  3  2020    Active



 tabletIndications:  mouth daily.          



  (spontaneous            



 vaginal delivery)            

 

 docusate calcium 240  Take 1 capsule by  60 capsule  1  2020    Active



 mg  mouth once daily          



 capsuleIndications:  as needed for          



  (spontaneous  Constipation.          



 vaginal delivery)            

 

 ferrous sulfate 325  Take 1 tablet by  60 tablet  2  2020    Active



 mg (65 mg iron)  mouth 2 (two)          



 tabletIndications:  times daily.          



  (spontaneous            



 vaginal delivery)            

 

 ibuprofen 600 mg  Take 1 tablet by  60 tablet  1  2020    Active



 tabletIndications:  mouth every 6          



  (spontaneous  (six) hours as          



 vaginal delivery)  needed (Pain).          



   Take with food or          



   milk.          

 

 HYDROcodone-acetamino  Take 1 tablet by  5 tablet  0  2020    Active



 phen 5-325 mg  mouth every 6          



 tabletIndications:  (six) hours as          



 S/P tubal ligation  needed for Pain          



   (scale 7-10).          



documented as of this encounter (statuses as of 2020)



Active Problems







 Problem  Noted Date

 

 Postpartum care and examination of lactating mother  2020

 

  (spontaneous vaginal delivery)  2020

 

 Anemia, postpartum  2020

 

 S/P tubal ligation  2020

 

 Single live   2020

 

 38 weeks gestation of pregnancy  2020

 

 Decreased fetal movements in third trimester  2020

 

 IUGR (intrauterine growth restriction) affecting care of mother, third  2020



 trimester, not applicable or unspecified fetus  

 

 Decreased fetal movement affecting management of pregnancy in third  2020



 trimester, not applicable or unspecified fetus  

 

 Obesity (BMI 30-39.9)  2020

 

 Death of child  2020

 

 Maternal varicella, non-immune  2019









 Overview: 







 Address in Postpartum









 Supervision of high risk pregnancy in third trimester  2019

 

 Grand multiparity  2016









 Overview: 







 Plans IUD pp









 History of  delivery, currently pregnant  2016









 Pregnant  Comments

 

 Yes  



documented as of this encounter (statuses as of 2020)



Resolved Problems







 Problem  Noted Date  Resolved Date

 

 24 weeks gestation of pregnancy  2019

 

 Vaginal discharge  2019

 

 History of urinary retention  10/11/2018  2019









 Overview: 







 After delivery









 Dysuria  10/11/2018  2019

 

 Normal vaginal delivery  2016  10/11/2018

 

 Urinary retention with incomplete bladder emptying  2016  10/11/2018









 Overview: 



Pt requiring Casas cath after delivery x 24 hrs, states occurred after last 
delivery also









 37 weeks gestation of pregnancy  2016  10/11/2018

 

 Insufficient prenatal care, third trimester  2016

 

 Threatened labor at term  2016

 

 Indication for care or intervention in labor or delivery-Active  2016  10
/2018



 Labor at Term    

 

 Antepartum anemia in third trimester  2016  10/11/2018

 

 Pelvic pressure in pregnancy, antepartum, third trimester  2016

 

 History of spontaneous , currently pregnant, third  2016



 trimester    

 

 History of anemia  2016

 

 Supervision of high-risk pregnancy with insufficient prenatal  2016



 care in third trimester    



documented as of this encounter (statuses as of 2020)



Immunizations







 Name  Administration Dates  Next Due

 

 TDAP (ADACEL) VACCINE  2019  

 

 Tdap  2016  



documented as of this encounter



Social History







 Tobacco Use  Types  Packs/Day  Years Used  Date

 

 Never Smoker        









 Smokeless Tobacco: Never Used      









 Comments: not a smoker









 Alcohol Use  Drinks/Week  oz/Week  Comments

 

 No  0 Standard drinks or equivalent  0.0  









 Pregnant  Comments

 

 Yes  









 Sex Assigned at Birth  Date Recorded

 

 Not on file  









 Job Start Date  Occupation  Industry

 

 Not on file  Not on file  Not on file









 Travel History  Travel Start  Travel End









 No recent travel history available.



documented as of this encounter



Last Filed Vital Signs

Not on filedocumented in this encounter



Plan of Treatment







 Date  Type  Specialty  Care Team  Description

 

 2020  Office Visit  [facility]  Cecilio Singh, FNP



  



       [address] [place] [address]



  



       [phone] (Fax)  









 Health Maintenance  Due Date  Last Done  Comments

 

 INFLUENZA VACCINE (#1)  2020    Postponed from 2019



       (Refused)

 

 VARICELLA VACCINES (1 of 2 -  2020    Postponed from 10/28/1991



 2-dose childhood series)      (Pregnant or



       Breastfeeding)

 

 PAP SMEAR  10/11/2021  10/11/2018  

 

 DTaP,Tdap,and Td Vaccines (3  2029,  



 - Td)    2016  

 

 PNEUMOCOCCAL 0-64 YEARS  Aged Out    No longer eligible based



 COMBINED SERIES      on patient's age to



       complete this topic



documented as of this encounter



Procedures







 Procedure Name  Priority  Date/Time  Associated Diagnosis  Comments

 

 HOSPITAL ADMISSION  Routine  2020 12:01 AM CST    



documented in this encounter



Results

Not on filedocumented in this encounter



Insurance







 Payer  Benefit Plan /  Subscriber ID  Effective  Phone  Address  Type



   Group    Dates      

 

 COMMUNITY  COMMUNITY  xxxxxxxxx  2019-Pres    [address]  Medicaid



 HEALTH CHOICE -  HEALTH CHOICE    ent    [id number]



  



 MANAGED  MEDICAID        [place], [place]  



 [place]ICA[place]          [id number]  



documented as of this encounter

## 2020-03-03 NOTE — XMS REPORT
Summary of Care

 Created on:2020



Patient:Dipti Donovan

Sex:Female

:1990

External Reference #:[id number]





Demographics







 Address  [address] [address] [address]



   [place] [address]

 

 Mobile Phone  [phone]

 

 Phone  [phone]

 

 Home Phone  [phone]

 

 Email Address  [email or web address]

 

 Preferred Language  English

 

 Marital Status  

 

 Synagogue Affiliation  Unknown

 

 Race  White

 

 Ethnic Group   or 









Author







 Organization  [facility]

 

 Address  [address] [place] [address]









Support







 Name  Relationship  Address  Phone

 

 Albert Peng  Unavailable  [address]  [phone]



     [place] [address]  









Care Team Providers







 Name  Role  Phone

 

 Cecilio Singh [facility]  Primary Care Provider  [phone]









Reason for Visit







 Reason  Comments

 

 Postpartum Care  



 (Routine)





 Status  Reason  Specialty  Diagnoses /  Referred By  Referred To



       Procedures  Contact  Contact

 

 New Request    [facility]  Diagnoses



 (spontaneous vaginal delivery)  [place],  



       



Procedures



DISCHARGE FOLLOW-UP:  [facility] CLIN[facility]  [facility]



  



         [address], [place]  



         [address]



  



         Phone:  



         [phone]



  



         Fax: [phone]  









Encounter Details







 Date  Type  Department  Care Team  Description

 

 2020  Routine Prenatal  [facility]  Cecilio Singh  Postpartum 
care and



   Visit  SALINAS Aguirre



  examination of



     [address]



  [address]  lactating mother



     [place], [place]  Janet



  (Primary Dx)



     [address]



  [place]  



     [phone]  [address]



  



       [phone]  



       (Fax)  







Allergies

No Known Allergiesdocumented as of this encounter (statuses as of 2020)



Medications







 Medication  Sig  Dispensed  Refills  Start Date  End Date  Status

 

 prenatal vitamin w/FA  Take 1 tablet by  100 tablet  3  2020    Active



 tabletIndications:  mouth daily.          



  (spontaneous            



 vaginal delivery)            

 

 docusate calcium 240  Take 1 capsule by  60 capsule  1  2020    Active



 mg  mouth once daily          



 capsuleIndications:  as needed for          



  (spontaneous  Constipation.          



 vaginal delivery)            

 

 ferrous sulfate 325  Take 1 tablet by  60 tablet  2  2020    Active



 mg (65 mg iron)  mouth 2 (two)          



 tabletIndications:  times daily.          



  (spontaneous            



 vaginal delivery)            

 

 ibuprofen 600 mg  Take 1 tablet by  60 tablet  1  2020    Active



 tabletIndications:  mouth every 6          



  (spontaneous  (six) hours as          



 vaginal delivery)  needed (Pain).          



   Take with food or          



   milk.          

 

 HYDROcodone-acetamino  Take 1 tablet by  5 tablet  0  2020    Active



 phen 5-325 mg  mouth every 6          



 tabletIndications:  (six) hours as          



 S/P tubal ligation  needed for Pain          



   (scale 7-10).          



documented as of this encounter (statuses as of 2020)



Active Problems







 Problem  Noted Date

 

 Postpartum care and examination of lactating mother  2020

 

  (spontaneous vaginal delivery)  2020

 

 Anemia, postpartum  2020

 

 S/P tubal ligation  2020

 

 Single live   2020

 

 38 weeks gestation of pregnancy  2020

 

 Decreased fetal movements in third trimester  2020

 

 IUGR (intrauterine growth restriction) affecting care of mother, third  2020



 trimester, not applicable or unspecified fetus  

 

 Decreased fetal movement affecting management of pregnancy in third  2020



 trimester, not applicable or unspecified fetus  

 

 Obesity (BMI 30-39.9)  2020

 

 Death of child  2020

 

 Maternal varicella, non-immune  2019









 Overview: 







 Address in Postpartum









 Supervision of high risk pregnancy in third trimester  2019

 

 Grand multiparity  2016









 Overview: 







 Plans IUD pp









 History of  delivery, currently pregnant  2016



documented as of this encounter (statuses as of 2020)



Resolved Problems







 Problem  Noted Date  Resolved Date

 

 24 weeks gestation of pregnancy  2019

 

 Vaginal discharge  2019

 

 History of urinary retention  10/11/2018  2019









 Overview: 







 After delivery









 Dysuria  10/11/2018  2019

 

 Normal vaginal delivery  2016  10/11/2018

 

 Urinary retention with incomplete bladder emptying  2016  10/11/2018









 Overview: 



Pt requiring Casas cath after delivery x 24 hrs, states occurred after last 
delivery also









 37 weeks gestation of pregnancy  2016  10/11/2018

 

 Insufficient prenatal care, third trimester  2016

 

 Threatened labor at term  2016

 

 Indication for care or intervention in labor or delivery-Active  2016  10
/2018



 Labor at Term    

 

 Antepartum anemia in third trimester  2016  10/11/2018

 

 Pelvic pressure in pregnancy, antepartum, third trimester  2016

 

 History of spontaneous , currently pregnant, third  2016



 trimester    

 

 History of anemia  2016

 

 Supervision of high-risk pregnancy with insufficient prenatal  2016



 care in third trimester    



documented as of this encounter (statuses as of 2020)



Immunizations







 Name  Administration Dates  Next Due

 

 TDAP (ADACEL) VACCINE  2019  

 

 Tdap  2016  



documented as of this encounter



Social History







 Tobacco Use  Types  Packs/Day  Years Used  Date

 

 Never Smoker        









 Smokeless Tobacco: Never Used      









 Comments: not a smoker









 Alcohol Use  Drinks/Week  oz/Week  Comments

 

 No  0 Standard drinks or equivalent  0.0  









 Sex Assigned at Birth  Date Recorded

 

 Not on file  









 Job Start Date  Occupation  Industry

 

 Not on file  Not on file  Not on file









 Travel History  Travel Start  Travel End









 No recent travel history available.



documented as of this encounter



Last Filed Vital Signs







 Vital Sign  Reading  Time Taken  Comments

 

 Blood Pressure  128/81  2020 10:51 AM CST  

 

 Pulse  76  2020 10:51 AM CST  

 

 Temperature  37.1 C (98.7 F)  2020 10:51 AM CST  

 

 Respiratory Rate  16  2020 10:51 AM CST  

 

 Oxygen Saturation  -  -  

 

 Inhaled Oxygen Concentration  -  -  

 

 Weight  66 kg (145 lb 9 oz)  2020 10:51 AM CST  

 

 Height  154.9 cm (5' 1")  2020 10:51 AM CST  

 

 Body Mass Index  27.5  2020 10:51 AM CST  



documented in this encounter



Progress Notes

Cecilio Singh, DALTONP - 2020 10:15 AM CSTFormatting of this note might 
be different from the original.

Chief complaint:

Chief Complaint

Patient presents with

 Postpartum Care



HPI The patient is here for a routine PP visit. Patient delivered male infant 
via  on 2020. She has no complaints today. She states that she is 
breastfeeding and formula feeding her infant, is bonding well, and coping well 
with less sleep. She reports that she has no pain, small lochia, and denies all 
s/s of PP depression. She had BTL for PP contraception.



Histories

OB History

 Para Term  AB Living

9 5 4 1 2 4

SAB TAB Ectopic Multiple Live Births

2 0 0 0 5



# Outcome Date GA Lbr Chiki/2nd Weight Sex Delivery Anes PTL Lv

9 

8 SAB 2018 13w0d

7 Term 16 37w6d  6 lb 12 oz (3.062 kg) F VAGINAL   PRATIBHA

6 Term 04/05/15 39w0d  6 lb 12 oz (3.062 kg) F NORMAL SPONT EPI  PRATIBHA

5  14 34w0d  4 lb 2 oz (1.871 kg) F NORMAL SPONT   PRATIBHA

   Complications: Placenta Previa

4 Term 13 39w0d  6 lb 12 oz (3.062 kg) F NORMAL SPONT EPI  PRATIBHA

3 Term 12 39w0d  7 lb 4 oz (3.289 kg) F NORMAL SPONT EPI  ND

2 2009 12w0d

1 



Past Medical History:

Diagnosis Date

 Anemia

 ongoing, taking supplements

 Grand multiparity 2016

 Plans IUD pp

 Heart murmur

 resolved, had a heart murmur as a child

 History of anemia 2016

 Indication for care or intervention in labor or delivery-Active Labor at 
Term 2016

 Normal vaginal delivery 2016

 Transfusion history 2012, (2)

 Urinary retention with incomplete bladder emptying 2016

 Pt requiring Casas cath after delivery x 24 hrs, states occurred after last 
delivery also



Family History

Problem Relation Age of Onset

 No Significant Medical Problems Mother

 No Significant Medical Problems Father

 No Significant Medical Problems Sister

 No Significant Medical Problems Brother

 No Significant Medical Problems Maternal Aunt

 No Significant Medical Problems Maternal Uncle

 No Significant Medical Problems Paternal Aunt

 No Significant Medical Problems Paternal Uncle

 No Significant Medical Problems Maternal Grandmother

 No Significant Medical Problems Maternal Grandfather

 No Significant Medical Problems Paternal Grandmother

 No Significant Medical Problems Paternal Grandfather

 Arthritis NoFHx

 Asthma NoFHx

 Birth defects NoFHx

 Breast Cancer NoFHx

 Colon Cancer NoFHx

 Ovarian Cancer NoFHx

 Uterine Cancer NoFHx

 Cancer NoFHx

 Depression NoFHx

 Diabetes NoFHx

 Genetic NoFHx

 Heart NoFHx

 High cholesterol NoFHx

 Hypertension NoFHx

 Mental retardation NoFHx

 Neurological NoFHx

 Osteoporosis NoFHx

 Psychiatry NoFHx

 Other - see comments NoFHx



Family Status

Relation Name Status

 Mo  Alive

 Fa  Alive

 Sis  Alive

 Bro  Alive

 MAunt  (Not Specified)

 MUnc  (Not Specified)

 PAunt  (Not Specified)

 PUnc  (Not Specified)

 MGMo  Alive

 MGFa  Alive

 PGMo  Alive

 PGFa  Alive

 NoFHx  (Not Specified)



Past Surgical History:

Procedure Laterality Date

 DELIVER PLACENTA  2016



 DILATION AND CURETTAGE (SHX)  

 remove left over placentae

 FETAL MONITOR W/REPORT  2016



 OBSTETRICAL CARE,VAG DELIV ONLY  2016



 OXYTOCIN INDUCTION/AUGMENTATION REFERENCE LINK ORDER  2016



 REPAIR VAGINA/PERINEUM  2016



 TUBAL LIGATION  2020



 TUBAL LIGATION N/A 2020

 Surgeon: Ayesha Montez MD;  Location: Labor and Delivery [facility] [place]



Social History



Socioeconomic History

 Marital status: 

  Spouse name: Not on file

 Number of children: 3

 Years of education: Not on file

 Highest education level: Not on file

Occupational History

 Occupation: none

Social Needs

 Financial resource strain: Not on file

 Food insecurity:

  Worry: Not on file

  Inability: Not on file

 Transportation needs:

  Medical: Not on file

  Non-medical: Not on file

Tobacco Use

 Smoking status: Never Smoker

 Smokeless tobacco: Never Used

 Tobacco comment: not a smoker

Substance and Sexual Activity

 Alcohol use: No

  Alcohol/week: 0.0 standard drinks

 Drug use: No

 Sexual activity: Yes

  Partners: Male

  Birth control/protection: None

  Comment: last sexual intercourse 2019

Lifestyle

 Physical activity:

  Days per week: Not on file

  Minutes per session: Not on file

 Stress: Not on file

Relationships

 Social connections:

  Talks on phone: Not on file

  Gets together: Not on file

  Attends Gnosticist service: Not on file

  Active member of club or organization: Not on file

  Attends meetings of clubs or organizations: Not on file

  Relationship status: Not on file

 Intimate partner violence:

  Fear of current or ex partner: Not on file

  Emotionally abused: Not on file

  Physically abused: Not on file

  Forced sexual activity: Not on file

Other Topics Concern

  Service Not Asked

 Blood Transfusions Yes

 Caffeine Concern Not Asked

 Occupational Exposure Not Asked

 Hobby Hazards Not Asked

 Sleep Concern Not Asked

 Stress Concern Not Asked

 Weight Concern Not Asked

 Special Diet Not Asked

 Back Care Not Asked

 Exercise Not Asked

 Bike Helmet Not Asked

 Seat Belt Not Asked

 Self-Exams Not Asked

Social History Narrative

 Pt states her Gnosticist preference is Jehovah's witness

 Patient lives with four daughters and a friend.

 Patient has no cats in the home.



Social History



Substance and Sexual Activity

Sexual Activity Yes

 Partners: Male

 Birth control/protection: None

 Comment: last sexual intercourse 2019







Labs

No new labs



Radiology

No new radiology.



Allergies

Dipti has No Known Allergies.



Medications

Dipti has a current medication list which includes the following prescription(
s): docusate calcium, ferrous sulfate, hydrocodone-acetaminophen, ibuprofen, 
and prenatal vitamin w/fa.



Review of Systems

Constitutional: Negative for activity change, appetite change, fatigue, 
unexpected weight change, weight gain and weight loss.

HENT: Negative for sore throat.

Eyes: Negative for visual disturbance.

Respiratory: Negative for cough and shortness of breath.

Breasts: Negative for discharge, mass, pain and unequal size.

Cardiovascular: Negative for chest pain, palpitations and leg swelling.

Gastrointestinal: Negative.  Negative for abdominal pain, anal bleeding, blood 
in stool, constipation, diarrhea, nausea, rectal pain and vomiting.

Genitourinary: Negative for bladder incontinence, dysuria, urgency, flank pain, 
vaginal bleeding, vaginal discharge, genital sores, vaginal pain and pelvic 
pain.

Skin: Negative for color change and rash.

Neurological: Negative.  Negative for dizziness, syncope and headaches.

Psychiatric/Behavioral: Negative for confusion, self-injury and sleep 
disturbance. The patient is not nervous/anxious.

Hematological: Negative for cold intolerance and heat intolerance.

Endocrine: Negative for hair loss, cold intolerance, heat intolerance, weight 
gain and weight loss.



/81 (BP Location: Right arm, Patient Position: Sitting, BP CUFF SIZE: 
Adult Medium)  | Pulse 76  | Temp 37.1 C (98.7 F) (Oral)  | Resp 16  | Ht 5
' 1" (1.549 m)  | Wt 145 lb 9 oz (66 kg)  | LMP 2019 (Exact Date)  | 
Breastfeeding Unknown  | BMI 27.50 kg/m

Pregravid BMI: Could not be calculated



Physical Exam

Vitals reviewed.

Constitutional: She is oriented to person, place, and time. She appears well-
developed and well-nourished. Her body habitus is normal.

Cardiovascular: Regular rate and rhythm. No peripheral edema present.

Pulmonary/Chest: Normal inspiratory effort.

Neuro/Psychiatric: Inappropriate mood and affect. She is oriented to person, 
place, and time.

Skin: Skin normal. No lesion, no rash and no ulceration present.





Lower transverse incision noted, incision dry and healing well.



Breast: Normal left breast and normal right breast

Rectal: normal rectum

External genitalia: Normal external genitalia appropriate for age.







Assessment/Plan

Postpartum care and examination of lactating mother  (primary encounter 
diagnosis)

Comment: Routine PPE

Plan: Denies zika virus risk, signs and symptoms such as fever,rash,joint pain, 
conjunctivitis (red eyes), muscle pain, headaches; outside [place] travel to areas 
affected by zika, and FOB exposure to zika.Educated on use of mosquito 
repellent.



Return to clinic in 3 weeks.

Discussed treatment options.

Medications as ordered.

Reviewed patient instructions and provided printed copy.



This visit did not involve counseling and coordination that comprised more than 
50% of the visit time.

SALINAS Joshi  2020  12:07 PM

Electronically signed by Cecilio Singh FNP at 2020 12:07 PM 
Sneha Bell RN - 2020 10:15 AM CSTPt in clinic today for 3 wk pp 
visit.



1) Delivery method vaginal

2) Patient delivered on 2020 at [place]

3) Patient is currently breastfeeding/bottlefeeding

4) Desired BCM: BTL

5) Patient denies  pp depression



Lochia: stopped bleeding

C/O: BTL incision is still hurting, 3/10Electronically signed by Sneha Stinson
, RN at 2020 11:27 AM CSTdocumented in this encounter



Plan of Treatment







 Date  Type  Specialty  Care Team  Description

 

 2020  Office Visit  [facility]  Cecilio Singh FNP



  



       [address] [place] [address]



  



       [phone] (Fax)  









 Health Maintenance  Due Date  Last Done  Comments

 

 INFLUENZA VACCINE (#1)  2020    Postponed from 2019



       (Refused)

 

 VARICELLA VACCINES (1 of 2 -  2020    Postponed from 10/28/1991



 2-dose childhood series)      (Pregnant or



       Breastfeeding)

 

 PAP SMEAR  10/11/2021  10/11/2018  

 

 DTaP,Tdap,and Td Vaccines (3  2029,  



 - Td)    2016  

 

 PNEUMOCOCCAL 0-64 YEARS  Aged Out    No longer eligible based



 COMBINED SERIES      on patient's age to



       complete this topic



documented as of this encounter



Results

Not on filedocumented in this encounter



Visit Diagnoses







 Diagnosis

 

 Postpartum care and examination of lactating mother - Primary



documented in this encounter



Insurance







 Payer  Benefit Plan /  Subscriber ID  Effective  Phone  Address  Type



   Group    Dates      

 

 [facility]  xxxxxxxxx  2019-Pres    [address]  Medicaid



 HEALTH CHOICE -  HEALTH CHOICE    [facility]    [id number]



  



 MANAGED MEDICAID [place]          [id number]  









 Guarantor Name  Account Type  Relation to  Date of Birth  Phone  Billing



     Patient      Address

 

 Dipti Donovan  Personal/Family  Self  1990  [phone]  [address]



         (Home)



  [address]







         [phone]  [place],



         (Work)  [place] [address]



documented as of this encounter

## 2020-03-03 NOTE — XMS REPORT
Summary of Care

 Created on:2020



Patient:Dipti Donovan

Sex:Female

:1990

External Reference #:[id number]





Demographics







 Address  [address]



   [place] [address]

 

 Mobile Phone  [phone]

 

 Phone  [phone]

 

 Home Phone  [phone]

 

 Email Address  [email or web address]

 

 Preferred Language  English

 

 Marital Status  

 

 Anabaptist Affiliation  Unknown

 

 Race  White

 

 Ethnic Group   or 









Author







 Organization  [facility]

 

 Address  [address] [place] [address]









Support







 Name  Relationship  Address  Phone

 

 Albert Khoury  Unavailable  [address]  [phone]



     [place] [address]  









Care Team Providers







 Name  Role  Phone

 

 Cecilio Singh FN  Primary Care Provider  [phone]









Reason for Visit







 Reason  Comments

 

 Assessment  Breast feeding, other children have the flu







Encounter Details







 Date  Type  Department  Care Team  Description

 

 2020  Telephone  [facility]  Cecilio Singh,  Assessment (
[facility]



     [facility]



  [facility], other



     [address]



  [address]



  children have the flu)



     [place], [place] [address] [phone] (Fax)  







Allergies

No Known Allergiesdocumented as of this encounter (statuses as of 2020)



Medications







 Medication  Sig  Dispensed  Refills  Start Date  End Date  Status

 

 prenatal vitamin w/FA  Take 1 tablet by  100 tablet  3  2020    Active



 tabletIndications:  mouth daily.          



  (spontaneous            



 vaginal delivery)            

 

 docusate calcium 240  Take 1 capsule by  60 capsule  1  2020    Active



 mg  mouth once daily          



 capsuleIndications:  as needed for          



  (spontaneous  Constipation.          



 vaginal delivery)            

 

 ferrous sulfate 325  Take 1 tablet by  60 tablet  2  2020    Active



 mg (65 mg iron)  mouth 2 (two)          



 tabletIndications:  times daily.          



  (spontaneous            



 vaginal delivery)            

 

 ibuprofen 600 mg  Take 1 tablet by  60 tablet  1  2020    Active



 tabletIndications:  mouth every 6          



  (spontaneous  (six) hours as          



 vaginal delivery)  needed (Pain).          



   Take with food or          



   milk.          

 

 HYDROcodone-acetamino  Take 1 tablet by  5 tablet  0  2020    Active



 phen 5-325 mg  mouth every 6          



 tabletIndications:  (six) hours as          



 S/P tubal ligation  needed for Pain          



   (scale 7-10).          



documented as of this encounter (statuses as of 2020)



Active Problems







 Problem  Noted Date

 

  (spontaneous vaginal delivery)  2020

 

 Anemia, postpartum  2020

 

 S/P tubal ligation  2020

 

 Single live   2020

 

 38 weeks gestation of pregnancy  2020

 

 Decreased fetal movements in third trimester  2020

 

 IUGR (intrauterine growth restriction) affecting care of mother, third  2020



 trimester, not applicable or unspecified fetus  

 

 Decreased fetal movement affecting management of pregnancy in third  2020



 trimester, not applicable or unspecified fetus  

 

 Obesity (BMI 30-39.9)  2020

 

 Death of child  2020

 

 Maternal varicella, non-immune  2019









 Overview: 







 Address in Postpartum









 Supervision of high risk pregnancy in third trimester  2019

 

 Grand multiparity  2016









 Overview: 







 Plans IUD pp









 History of  delivery, currently pregnant  2016









 Pregnant  Estimated Date of Delivery  Comments

 

 Yes  2020  Based on last menstrual period of 2019 (Exact



     Date)



documented as of this encounter (statuses as of 2020)



Resolved Problems







 Problem  Noted Date  Resolved Date

 

 24 weeks gestation of pregnancy  2019

 

 Vaginal discharge  2019

 

 History of urinary retention  10/11/2018  2019









 Overview: 







 After delivery









 Dysuria  10/11/2018  2019

 

 Normal vaginal delivery  2016  10/11/2018

 

 Urinary retention with incomplete bladder emptying  2016  10/11/2018









 Overview: 



Pt requiring Casas cath after delivery x 24 hrs, states occurred after last 
delivery also









 37 weeks gestation of pregnancy  2016  10/11/2018

 

 Insufficient prenatal care, third trimester  2016

 

 Threatened labor at term  2016

 

 Indication for care or intervention in labor or delivery-Active  2016  10
/2018



 Labor at Term    

 

 Antepartum anemia in third trimester  2016  10/11/2018

 

 Pelvic pressure in pregnancy, antepartum, third trimester  2016

 

 History of spontaneous , currently pregnant, third  2016



 trimester    

 

 History of anemia  2016

 

 Supervision of high-risk pregnancy with insufficient prenatal  2016



 care in third trimester    



documented as of this encounter (statuses as of 2020)



Immunizations







 Name  Administration Dates  Next Due

 

 TDAP (ADACEL) VACCINE  2019  

 

 Tdap  2016  



documented as of this encounter



Social History







 Tobacco Use  Types  Packs/Day  Years Used  Date

 

 Never Smoker        









 Smokeless Tobacco: Never Used      









 Comments: not a smoker









 Alcohol Use  Drinks/Week  oz/Week  Comments

 

 No  0 Standard drinks or equivalent  0.0  









 Pregnant  Estimated Date of Delivery  Comments

 

 Yes  2020  Based on last menstrual period of 2019 (Exact



     Date)









 Sex Assigned at Birth  Date Recorded

 

 Not on file  









 Job Start Date  Occupation  Industry

 

 Not on file  Not on file  Not on file









 Travel History  Travel Start  Travel End









 No recent travel history available.



documented as of this encounter



Last Filed Vital Signs

Not on filedocumented in this encounter



Plan of Treatment







 Date  Type  Specialty  Care Team  Description

 

 2020  Routine Prenatal Visit  [facility]  Cecilio Singh, FNP



  



       [address] [place], [place] [address] [phone] (Fax)  









 Health Maintenance  Due Date  Last Done  Comments

 

 INFLUENZA VACCINE (#1)  2020    Postponed from 2019



       (Refused)

 

 VARICELLA VACCINES (1 of 2 -  2020    Postponed from 10/28/1991



 2-dose childhood series)      (Pregnant or



       Breastfeeding)

 

 PAP SMEAR  10/11/2021  10/11/2018  

 

 DTaP,Tdap,and Td Vaccines (3  2029,  



 - Td)    2016  

 

 PNEUMOCOCCAL 0-64 YEARS  Aged Out    No longer eligible based



 COMBINED SERIES      on patient's age to



       complete this topic



documented as of this encounter



Results

Not on filedocumented in this encounter



Insurance







 Payer  Benefit Plan /  Subscriber ID  Effective  Phone  Address  Type



   Group    Dates      

 

 [facility]  xxxxxxxxx  2019-Pres    [address]  Medicaid



 HEALTH CHOICE -  HEALTH CHOICE    [facility]    [id number]



  



 [place]NAGED MEDICAID [place]          [id number]  



documented as of this encounter

## 2020-03-03 NOTE — XMS REPORT
Summary of Care

 Created on:2020



Patient:Dipti Donovan

Sex:Female

:1990

External Reference #:[id number]





Demographics







 Address  [address] [address]



   [place] [address]

 

 Mobile Phone  [phone]

 

 Phone  [phone]

 

 Home Phone  [phone]

 

 Email Address  [email or web address]

 

 Preferred Language  English

 

 Marital Status  

 

 [employer] Affiliation  Unknown

 

 Race  White

 

 Ethnic Group   or 









Author







 Organization  [facility]

 

 Address  [address] [place] [address]









Support







 Name  Relationship  Address  Phone

 

 Albert Khoury  Unavailable  [address]  [phone]



     [place] [address]  









Care Team Providers







 Name  Role  Phone

 

 Cecilio Singh  Primary Care Provider  [phone]









Reason for Referral

 (Routine)





 Status  Reason  Specialty  Diagnoses /  Referred By  Referred To



       Procedures  Contact  Contact

 

 New Request    OB Satellites  Diagnoses



 (spontaneous vaginal delivery)  [place],  



       



[facility]



DISCHARGE FOLLOW-UP:  OB/GYN CLINIC  [facility]



  



         [address] [place] [address]  



         [address]



  



         Phone:  



         [phone]



  



         Fax: [phone]  









Reason for Visit

Auth/Cert





 Status  Reason  Specialty  Diagnoses /  Referred By Contact  Referred To 
Contact



       Procedures    

 

     Obstetrics  Diagnoses



IOL



Pelvic pain    J7c







           [address] [place] [address]



           [address]







           Phone: [phone]







           Fax: [phone]









Encounter Details







 Date  Type  Department  Care Team  Description

 

 2020 -  Hospital Encounter  Mother Baby Unit  Amrik Lafleur   (
spontaneous



 2020    (J7C)



  MD Claudio



  vaginal delivery)



     [address] [place] [address]



  [id number]



  



     [place], [place]  



     [address]



  [id number] [phone]  



       (Fax)  







Allergies

No Known Allergiesdocumented as of this encounter (statuses as of 2020)



Medications







 Medication  Sig  Dispensed  Refills  Start Date  End Date  Status

 

 prenatal vitamin  Take 1 tablet  100 tablet  3  2020    Active



 w/FA  by mouth          



 tabletIndications:  daily.          



  (spontaneous            



 vaginal delivery)            

 

 docusate calcium  Take 1 capsule  60 capsule  1  2020    Active



 240 mg  by mouth once          



 capsuleIndications  daily as          



 :  (spontaneous  needed for          



 vaginal delivery)  Constipation.          

 

 ferrous sulfate  Take 1 tablet  60 tablet  2  2020    Active



 325 mg (65 mg  by mouth 2          



 iron)  (two) times          



 tabletIndications:  daily.          



  (spontaneous            



 vaginal delivery)            

 

 ibuprofen 600 mg  Take 1 tablet  60 tablet  1  2020    Active



 tabletIndications:  by mouth every          



  (spontaneous  6 (six) hours          



 vaginal delivery)  as needed          



   (Pain). Take          



   with food or          



   milk.          

 

 HYDROcodone-acetam  Take 1 tablet  5 tablet  0  2020    Active



 inophen 5-325 mg  by mouth every          



 tabletIndications:  6 (six) hours          



 S/P tubal ligation  as needed for          



   Pain (scale          



   7-10).          

 

 prenatal vit  Take 1 Packet  30 Each  6  2019  Discontinued



 33-iron-folic-dha  by mouth          



 (SELECT-OB + DHA)  daily.          



 29 mg iron-1 mg            



 -250 mg combo            



 packIndications:            



 Supervision of            



 high risk            



 pregnancy,            



 antepartum            



documented as of this encounter (statuses as of 2020)



Active Problems







 Problem  Noted Date

 

  (spontaneous vaginal delivery)  2020

 

 Anemia, postpartum  2020

 

 S/P tubal ligation  2020

 

 Single live   2020

 

 38 weeks gestation of pregnancy  2020

 

 Decreased fetal movements in third trimester  2020

 

 IUGR (intrauterine growth restriction) affecting care of mother, third  2020



 trimester, not applicable or unspecified fetus  

 

 Decreased fetal movement affecting management of pregnancy in third  2020



 trimester, not applicable or unspecified fetus  

 

 Obesity (BMI 30-39.9)  2020

 

 Death of child  2020

 

 Maternal varicella, non-immune  2019









 Overview: 







 Address in Postpartum









 Supervision of high risk pregnancy in third trimester  2019

 

 Grand multiparity  2016









 Overview: 







 Plans IUD pp









 History of  delivery, currently pregnant  2016









 Pregnant  Estimated Date of Delivery  Comments

 

 Yes  2020  Based on last menstrual period of 2019 (Exact



     Date)



documented as of this encounter (statuses as of 2020)



Resolved Problems







 Problem  Noted Date  Resolved Date

 

 24 weeks gestation of pregnancy  2019

 

 Vaginal discharge  2019

 

 History of urinary retention  10/11/2018  2019









 Overview: 







 After delivery









 Dysuria  10/11/2018  2019

 

 Normal vaginal delivery  2016  10/11/2018

 

 Urinary retention with incomplete bladder emptying  2016  10/11/2018









 Overview: 



Pt requiring Casas cath after delivery x 24 hrs, states occurred after last 
delivery also









 37 weeks gestation of pregnancy  2016  10/11/2018

 

 Insufficient prenatal care, third trimester  2016

 

 Threatened labor at term  2016

 

 Indication for care or intervention in labor or delivery-Active  2016  10
/2018



 Labor at Term    

 

 Antepartum anemia in third trimester  2016  10/11/2018

 

 Pelvic pressure in pregnancy, antepartum, third trimester  2016

 

 History of spontaneous , currently pregnant, third  2016



 trimester    

 

 History of anemia  2016

 

 Supervision of high-risk pregnancy with insufficient prenatal  2016



 care in third trimester    



documented as of this encounter (statuses as of 2020)



Immunizations







 Name  Administration Dates  Next Due

 

 TDAP (ADACEL) VACCINE  2019  

 

 Tdap  2016  



documented as of this encounter



Social History







 Tobacco Use  Types  Packs/Day  Years Used  Date

 

 Never Smoker        









 Smokeless Tobacco: Never Used      









 Comments: not a smoker









 Alcohol Use  Drinks/Week  oz/Week  Comments

 

 No  0 Standard drinks or equivalent  0.0  









 Pregnant  Estimated Date of Delivery  Comments

 

 Yes  2020  Based on last menstrual period of 2019 (Exact



     Date)









 Sex Assigned at Birth  Date Recorded

 

 Not on file  









 Job Start Date  Occupation  Industry

 

 Not on file  Not on file  Not on file









 Travel History  Travel Start  Travel End









 No recent travel history available.



documented as of this encounter



Last Filed Vital Signs







 Vital Sign  Reading  Time Taken  Comments

 

 Blood Pressure  107/70  2020  8:00 AM CST  

 

 Pulse  77  2020  8:00 AM CST  

 

 Temperature  36.9 C (98.4 F)  2020  8:00 AM CST  

 

 Respiratory Rate  18  2020  8:00 AM CST  

 

 Oxygen Saturation  99%  2020  8:00 AM CST  

 

 Inhaled Oxygen Concentration  -  -  

 

 Weight  72.1 kg (159 lb)  2020  3:55 PM CST  

 

 Height  154.9 cm (5' 1")  2020  3:55 PM CST  

 

 Body Mass Index  30.04  2020  3:55 PM CST  



documented in this encounter



Discharge Instructions

AttachmentsThe following attachments cannot be sent through Care 
Everywhere.Birth, Breast Care After  (English)Vaginal Birth, After a (English)
documented in this encounter



Progress Notes

Dennis Padilla MD - 2020  3:45 AM CSTOb Progress Note



S/p  still desires BTL



Dennis Padilla MD

Electronically signed by Dennis Padilla MD at 2020  3:46 AM CSTCinthia Kapoor MD - 2020  4:47 PM CSTFormatting of this note might be 
different from the original.

BTL COUNSELING NOTE



Dipti Donovan is a   @ 38w0d presenting for IOL



"I counseled the patient regarding incentives, risks, benefits and alternatives 
of postpartum Bilateral Tubal Ligation including: risk of infection, bleeding, 
need for transfusion of blood/blood products, injury to ureter, bladder, bowel 
with possible further surgery, stint, catheterization or colostomy to repair. 
The permanence of this procedure and risk of regret in 1 in 3 women were 
discussed. Postpartum tubal ligation failure rate is 0.75% and higher in women 
younger than 30 years old. There is an increased risk for ectopic pregnancy and 
the need to seek medical attention should failure of tubal ligation occur. 
Alternatives discussed include: Oral Contraceptive Agents, Intrauterine Device, 
Depo Provera, Ring, Patch, Condoms/foam, interval bilateral tubal ligation, or 
vasectomy of partner. Allquestions answered, and patient expressed her desire 
to proceed with bilateral tubal ligation surgery."

HGB (g/dL)

Date Value

2020 9.4 (L)



HCT (%)

Date Value

2020 30.9 (L)



PLT (10*3/L)

Date Value

2020 249





Previous abdominal surgery:

Past Surgical History:

Procedure Laterality Date

 DELIVER PLACENTA  2016



 DILATION AND CURETTAGE (SHX)  2016

 remove left over placentae

 FETAL MONITOR W/REPORT  2016



 OBSTETRICAL CARE,VAG DELIV ONLY  2016



 OXYTOCIN INDUCTION/AUGMENTATION REFERENCE LINK ORDER  2016



 REPAIR VAGINA/PERINEUM  2016





No Known Allergies



Cinthia Kapoor MD

PGY-2, Department of Obstetrics and Gynecology

Pager# [phone] signed by Cinthia Kapoor MD at 2020
  6:11 PM CSTdocumented in this encounter



Plan of Treatment







 Date  Type  Specialty  Care Team  Description

 

 2020  Routine Prenatal Visit  OB Satellites  Cecilio Singh, FNP



  



       [address] [place] [address]



  



       [phone] (Fax)  









 Name  Type  Priority  Associated Diagnoses  Date/Time

 

 SURGICAL PATHOLOGY EXAM  LAB  STAT    2020  7:05 AM CST









 Name  Type  Priority  Associated Diagnoses  Order Schedule

 

 SURGICAL PATHOLOGY EXAM  LAB  Routine    ONCE for 1 Occurrences



         starting 2020, 1



         completed









 Health Maintenance  Due Date  Last Done  Comments

 

 INFLUENZA VACCINE (#1)  2020    Postponed from 2019



       (Refused)

 

 VARICELLA VACCINES (1 of 2 -  2020    Postponed from 10/28/1991



 2-dose childhood series)      (Pregnant or



       Breastfeeding)

 

 PAP SMEAR  10/11/2021  10/11/2018  

 

 DTaP,Tdap,and Td Vaccines (3  2029,  



 - Td)    2016  

 

 PNEUMOCOCCAL 0-64 YEARS  Aged Out    No longer eligible based



 COMBINED SERIES      on patient's age to



       complete this topic



documented as of this encounter



Procedures







 Procedure Name  Priority  Date/Time  Associated  Comments



       Diagnosis  

 

 CBC WITH DIFFERENTIAL  Routine  2020  3:43    Results for this



     AM CST    procedure are in



         the results



         section.

 

 CBC WITH DIFFERENTIAL  Routine  2020  3:43    Results for this



     AM CST    procedure are in



         the results



         section.

 

 VENOUS CORD GAS  ASAP  2020  3:42    Results for this



     AM CST    procedure are in



         the results



         section.

 

 ARTERIAL CORD GAS  ASAP  2020  3:42    Results for this



     AM CST    procedure are in



         the results



         section.

 

 GALV ONLY - SYPHILIS  ASAP  2020  5:12    Results for this



 IGG/IGM    PM CST    procedure are in



         the results



         section.

 

 HEPATITIS B SURFACE  ASAP  2020  5:12    Results for this



 ANTIGEN    PM CST    procedure are in



         the results



         section.

 

 RHO (D) IMMUNE  Routine  2020  4:36    Results for this



 GLOBULIN    PM CST    procedure are in



         the results



         section.

 

 HB ABO GROUPING  ASAP  2020  4:36    Results for this



     PM CST    procedure are in



         the results



         section.



documented in this encounter



Results

CBC WITH DIFFERENTIAL (2020  3:43 AM CST)





 Component  Value  Ref Range  Performed At  Pathologist Signature

 

 WBC  14.01 (H)  4.30 - 11.10  [facility]  



     10*3/L  SERVICES  

 

 RBC  3.81 (L)  3.93 - 5.25  [facility]  



     10*6/L  SERVICES  

 

 HGB  8.3 (L)  11.6 - 15.0  [facility]  



     g/dL  SERVICES  

 

 HCT  27.8 (L)  35.7 - 45.2 %  [facility]  



       SERVICES  

 

 MCV  73.0 (L)  80.6 - 95.5 fL  [facility]  



       SERVICES  

 

 MCH  21.8 (L)  25.9 - 32.8 pg  [facility]  



       SERVICES  

 

 MCHC  29.9 (L)  31.6 - 35.1  [facility]  



     g/dL  SERVICES  

 

 RDW-SD  40.4  39.0 - 49.9 fL  [facility]  



       SERVICES  

 

 RDW-CV  15.5  12.0 - 15.5 %  [facility]  



       SERVICES  

 

 PLT  218  166 - 358  [facility]  



     10*3/L  SERVICES  

 

 MPV  10.8  9.5 - 12.9 fL  [facility]  



       SERVICES  

 

 NRBC/100 WBC  0.0  0.0 - 10.0 /100  [facility]  



     WBCs  SERVICES  

 

 NRBC x10^3  <0.01  10*3/L  [facility]  



       SERVICES  

 

 GRAN MAT (NEUT) %  70.8  %  [facility]  



       SERVICES  

 

 IMM GRAN %  0.80  %  [facility]  



       SERVICES  

 

 LYMPH %  16.0  %  [facility]  



       SERVICES  

 

 MONO %  11.1  %  [facility]  



       SERVICES  

 

 EOS %  1.1  %  [facility]  



       SERVICES  

 

 BASO %  0.2  %  [facility]  



       SERVICES  

 

 GRAN MAT x10^3(ANC)  9.92 (H)  1.88 - 7.09  [facility]  



     10*3/uL  SERV[facility]  

 

 IMM GRAN x10^3  0.11 (H)  0.00 - 0.06  [facility]  



     10*3/uL  SERVICES  

 

 LYMPH x10^3  2.24  1.32 - 3.29  [facility]  



     10*3/uL  SERVICES  

 

 MONO x10^3  1.56 (H)  0.33 - 0.92  [facility]  



     10*3/uL  SERVICES  

 

 EOS x10^3  0.15  0.03 - 0.39  [facility]  



     10*3/uL  SERVICES  

 

 BASO x10^3  0.03  0.01 - 0.07  [facility]  



     10*3/uL  SERVICES  









 Specimen

 

 Blood - ARM, RIGHT









 Performing Organization  Address  City/[facility]/Zipcode  Phone Number

 

 [facility]  CLIA:  [id number], [address] [place] [address]  [phone]
[phone]



   [facility]    



ARTERIAL CORD GAS (2020  3:42 AM CST)





 Component  Value  Ref Range  Performed At  Pathologist Signature

 

 BASE EXCESS, CORD  -2.3  mEq/L  [facility]  



       SERVICES  

 

 AC PH, CORD (BEAKER)  7.28  7.18 - 7.38  [facility]  



       SERVICES  

 

 PC02, CORD  56  32 - 66 mmHg  [facility]  



       SERVICES  

 

 PO2, CORD  17  10 - 30 mmHg  [facility]  



       SERVICES  

 

 BICARBONATE, CORD  26  17 - 27 mEq/L  [facility]  



       SERVICES  









 Specimen

 

 Blood - CORD









 Performing Organization  Address  City/[facility]  Phone Number

 

 [facility]  CLIA:  [id number], [address] [place] [address]  [phone]
[phone]



   [facility]    



VENOUS CORD GAS (2020  3:42 AM CST)





 Component  Value  Ref Range  Performed At  Pathologist Signature

 

 VENOUS BASE EXCESS,  -0.4  mEq/L  [facility]  



 CORD      SERVICES  

 

 VENOUS PH, CORD  7.37  7.25 - 7.45  [facility]  



       SERVICES  

 

 VENOUS PC02, CORD  44  27 - 49 mmHg  [facility]  



       SERVICES  

 

 VENOUS PO2, CORD  32  17 - 41 mmHg  [facility]  



       SERVICES  

 

 VENOUS BICARBONATE,  25  12 - 29 mEq/L  [facility]  



 CORD      SERVICES  









 Specimen

 

 Blood - CORD









 Performing Organization  Address  City/[facility]  Phone Number

 

 [facility]  CLIA:  [id number], [address] [place], [place] [address] [phone]
[phone]



   [facility]    



GALV ONLY - SYPHILIS IGG/IGM (2020  5:12 PM CST)





 Component  Value  Ref Range  Performed At  Pathologist Signature

 

 Syphilis IgG/IgM  Non-reactive  Non-reactive  [facility]  



       SERVICES  









 Specimen

 

 Blood - VENOUS









 Narrative  Performed At

 

  



  [facility]



 Non-reactive - No serologic evidence of T. pallidum  



 infection. Cannot exclude incubating or early syphilis.  



 Submit a second specimen in 2-4 weeks if syphilis is  



 clinically suspected.



  



  



  



 Equivocal - Further testing to follow.



  



  



  



 Reactive - Further testing to follow.  









 Performing Organization  Address  City/State/Zipcode  Phone Number

 

 [facility]  CLIA:  [id number], [address] [place] [address]  [phone]
[phone]



   [facility]    



Hepatitis B Surface Antigen (2020  5:12 PM CST)





 Component  Value  Ref Range  Performed At  Pathologist Signature

 

 HBsAg  Negative  Negative  [facility]  



       SERVICES  

 

 HBsAg  0.04    [facility]  



 Semi-Quantitative      SERVICES  









 Specimen

 

 Blood - ARM, LEFT









 Performing Organization  Address  City/[facility]/Zipcode  Phone Number

 

 [facility]  CLIA:  [id number], [address] [place] [address]  [phone]
[phone]



   [facility]    



RHO (D) IMMUNE GLOBULIN (2020  4:36 PM CST)





 Component  Value  Ref Range  Performed At  Pathologist [facility]

 

 RHIG CANDIDATE?  No- see comment    LAB  



   Comment:      



   Patient is not a candidate for RhIg- Patient is Rh Positive.      



   Performed at [facility] [address] [address]      



   Toll Free: [phone]      



   CLIA No. [id number]      



         









 Specimen

 

 Blood - VENOUS









 Performing Organization  Address  City/[facility]  Phone Number

 

 [facility]      

 

 LAB      



Type and Screen - ONCE ASAP (2020  4:36 PM CST)





 Component  Value  Ref Range  Performed At  Pathologist [facility]

 

 ABO & RH  AB POSITIVE    LAB  



   Comment:      



   Performed at [facility] [address] [address]      



   Toll Free: [phone]      



   CLIA No. [id number]      



         

 

 IAT  Negative    LAB  



   Comment:      



   Performed at [facility] [address] [address]      



   Toll Free: [phone]      



   CLIA No. [id number]      



         









 Specimen

 

 Blood - VENOUS









 Performing Organization  Address  City/[facility]/Zipcode  Phone Number

 

 [facility]      

 

 LAB      



documented in this encounter



Visit Diagnoses







 Diagnosis

 

  (spontaneous vaginal delivery) - Primary







 Normal delivery

 

 S/P tubal ligation







 Tubal ligation status

 

 Maternal varicella, non-immune







 Supervision of other high-risk pregnancy

 

 Anemia, postpartum

 

 Single live 



documented in this encounter



Administered Medications







 Medication Order  MAR Action  Action Date  Dose  Rate  Site

 

 acetaminophen (TYLENOL) tablet  Given  2020  9:58 AM CST  650 mg    



 650 mg



          



 650 mg, Oral, Q6HPRN, Starting          



 20 at 0346, Until          



 Discontinued, Routine, Pain          



 (scale 4-6)          









   









 D5W-LR IV infusion 1,000 mL



  New Bag  2020  5:14 PM CST  1,000 mL  125 mL/hr  



 at 125 mL/hr, IV Infusion,          



 CONTINUOUS, Starting Thu 20          



 at 1615, Until Discontinued,          



 Routine          









   









 docusate calcium (SURFAK) capsule 240 mg



  Given  2020 12:17 PM CST  240 mg    



 240 mg, Oral, QDAILYPRN, Starting 20 at 1021, Until Discontinued,          



 Routine, Constipation          









   









 HYDROcodone-acetaminophen (NORCO 5) 5-325  Given  2020 10:08 AM CST  1 
tablet    



 mg tablet 1 tablet



          



 1 tablet, Oral, Q6HPRN, Starting 20 at 1413, Until Discontinued,          



 Routine, Pain (scale 7-10)          









 Given  2020  3:47 AM CST  1 tablet    

 

 Given  2020  9:27 PM CST  1 tablet    









   









 ibuprofen (IBU) tablet 600 mg



  Given  2020  6:00 AM CST  600 mg    



 600 mg, Oral, Q6HPRN, Starting 20          



 at 1021, Until Discontinued, Routine, Pain          



 (scale 4-6)          









 Given  2020 12:05 AM CST  600 mg    

 

 Given  2020  6:14 PM CST  600 mg    









   









 lactated ringers IV infusion 500  New Bag  2020  1:42 AM CST  500 mL  
999 mL/hr  



 mL



          



 at 999 mL/hr, 500 mL, IV Infusion,          



 PRN - SEE INSTRUCTIONS, Starting          



 Thu 20 at 1604, Until          



 Discontinued, Routine          









   

 

 lactated ringers IV infusion 500 mL



  



 at 999 mL/hr, 500 mL, IV Infusion, PRN - SEE INSTRUCTIONS, 1 dose, Starting 
20 at 0148, Until Discontinued, Routine  

 

   

 

 lidocaine 1% (PF) (XYLOCAINE) injection 0.3 mL



  



 0.3 mL, Infiltration, PRN - SEE INSTRUCTIONS, Starting Thu 20 at 1604, 
Until  



 Discontinued, Routine, Local anesthesia, For IV line placement only as a local
  



 anesthetic.  

 

   

 

 lidocaine 1% (XYLOCAINE) 10 mg/mL (1 %) injection 30 mL



  



 30 mL, Infiltration, PRN - SEE INSTRUCTIONS, Starting Thu 20 at 1604, 
Until  



 Discontinued, Routine, Local anesthesia, For laceration repair only as a local
  



 anesthetic as indicated.  

 

   









 LR 1000 mL + oxytocin 20  Rate Change  2020  8:00 PM  6 brennan-units/min  
18 mL/hr  



 units IV Solution



    CST      



 2 brennan-units/min (6          



 mL/hr), at 6 mL/hr, IV          



 Infusion, TITRATE,          



 Starting Thu 20 at          



 1605, Until Discontinued,          



 ASAP, Oxytocin induction.          









 Rate Change  2020  7:30 PM CST  4 brennan-units/min  12 mL/hr  

 

 New Bag  2020  6:07 PM CST  2 brennan-units/min  6 mL/hr  









   









 magnesium hydroxide (MILK OF MAGNESIA) 400  Given  2020  6:00 AM CST  30 
mL    



 mg/5 mL suspension 30 mL



          



 30 mL, Oral, QDAILYPRN, Starting 20          



 at 1021, Until Discontinued, Routine,          



 Constipation          









 Given  2020 12:17 PM CST  30 mL    









   

 

 naloxone (NARCAN) injection 0.4 mg



  



 0.4 mg, Slow IV Push, PRN - SEE INSTRUCTIONS, Starting 20 at 0852, 
Until  



 Sun 20 at 0851, Routine, Analgesia Recovery, PACU  

 

   









 simethicone (GAS RELIEF (SIMETHICONE))  Given  2020  6:00 AM CST  160 mg
    



 chewable tablet 160 mg



          



 160 mg, Oral, PC+HSPRN, Starting 20          



 at 1021, Until Discontinued, Routine, Gas          









 Given  2020  6:14 PM CST  160 mg    

 

 Given  2020 12:17 PM CST  160 mg    









   

 

 sodium citrate-citric acid (BICITRA) 500-334 mg/5 mL solution 30 mL



  



 30 mL, Oral, PRE-PROCEDURE ONCE, 1 dose, Starting Thu 20 at 1604, Until  



 Discontinued, Routine, Surgery/Procedure  

 

   









 









 Medication Order  MAR Action  Action Date  Dose  Rate  Site

 

 ketorolac (TORADOL) injection  Given  2020  9:04 AM CST  30 mg    Right 
Leg



 30 mg



          



 30 mg, Intramuscular, ONCE, 1          



 dose, 20 at 0900,          



 Routine, PACU,           



 approving Restricted          



 medication: LD PACU          









   









 lactated ringers IV infusion 500  New Bag  2020  3:26 AM CST  500 mL  
999 mL/hr  



 mL



          



 at 999 mL/hr, 500 mL, IV Infusion,          



 ONCE, 1 dose, Fri 20 at 0300,          



 Routine          









   









 LR 1000 mL + oxytocin 20 units IV  Given  2020  3:40 AM CST    999 mL/hr
  



 Solution



          



 at 999 mL/hr, IV Infusion, ONCE, 1 dose,          



 Fri 20 at 0500, Routine          









   









 sodium citrate-citric acid (BICITRA) 500-334  Given  2020  1:42 AM CST  
30 mL    



 mg/5 mL solution 30 mL



          



 30 mL, Oral, PRE-PROCEDURE ONCE, 1 dose,          



 Starting Thu 20 at 1604, Until 20 at 0142, Routine, Surgery/Procedure          









   









 sodium citrate-citric acid (BICITRA) 500-334  Given  2020  6:32 AM CST  
30 mL    



 mg/5 mL solution 30 mL



          



 30 mL, Oral, PRE-PROCEDURE ONCE, 1 dose,          



 Starting 20 at 0148, Until 20 at 0632, Routine, Surgery/Procedure          









   



documented in this encounter



Insurance







 Payer  Benefit Plan /  Subscriber ID  Effective  Phone  Address  Type



   Group    [facility]  xxxxxxxxx  2019-Pres    [address]  Medicaid



 HEALTH CHOICE -  HEALTH CHOICE    [facility]    [id number]



  



 MANAGED  MEDICAID        [place]          [id number]  









 Guarantor Name  Account Type  Relation to  Date of Birth  Phone  Billing



     Patient      Address

 

 Dipti Donovan  Personal/Family  Self  1990  [phone]  [address] [place] [address]



         (Home)



  [address]







         [phone]  [facility]



         (Work)  [place] [address]



documented as of this encounter

## 2020-03-03 NOTE — XMS REPORT
Summary of Care

 Created on:2020



Patient:Dipti Donovan

Sex:Female

:1990

External Reference #:[id number]





Demographics







 Address  [address]



   [place] [address]

 

 Mobile Phone  [phone]

 

 Phone  [phone]

 

 Home Phone  [phone]

 

 Email Address  [email or web address]

 

 Preferred Language  English

 

 Marital Status  

 

 Religion Affiliation  Unknown

 

 Race  White

 

 Ethnic Group   or 









Author







 Organization  [facility]

 

 Address  [address] [place] [address]









Support







 Name  Relationship  Address  Phone

 

 Albert Khoury  Unavailable  [address]  [phone]



     [place] [address]  









Care Team Providers







 Name  Role  Phone

 

 Cecilio Singh [facility]  Primary Care Provider  [phone]









Reason for Visit







 Reason  Comments

 

 Abnormal Lab  Anemia







Encounter Details







 Date  Type  Department  Care Team  Description

 

 2020  [facility]  [facility],  Abnormal Lab (
Anemia)



     [facility]



  



     [address] [facility]



  [address] [place], [place] [address] [phone] (Fax)  







Allergies

No Known Allergiesdocumented as of this encounter (statuses as of 2020)



Medications







 Medication  Sig  Dispensed  Refills  Start Date  End Date  Status

 

 prenatal vit  Take 1 Packet by  30 Each  6  2019    Active



 33-iron-folic-dha  mouth daily.          



 (SELECT-OB + DHA) 29 mg            



 iron-1 mg -250 mg combo            



 packIndications:            



 Supervision of high            



 risk pregnancy,            



 antepartum            

 

 ferrous sulfate 325 mg  Take 1 tablet by  60 tablet  3  2020    Active



 (65 mg iron)  mouth 2 (two)          



 tabletIndications:  times daily.          



 Anemia of mother in            



 pregnancy, antepartum            

 

 ascorbic acid, vitamin  Take 1 tablet by  90 tablet  3  2020    Active



 C, 500 mg  mouth 3 (three)          



 tabletIndications:  times daily.          



 Anemia of mother in            



 pregnancy, antepartum            



documented as of this encounter (statuses as of 2020)



Active Problems







 Problem  Noted Date

 

 Death of child  2020

 

 Maternal varicella, non-immune  2019









 Overview: 







 Address in Postpartum









 Supervision of high risk pregnancy, antepartum  2019

 

 Grand multiparity  2016









 Overview: 







 Plans IUD pp









 History of  delivery, currently pregnant  2016









 Pregnant  Estimated Date of Delivery  Comments

 

 Yes  2020  Based on last menstrual period of 2019 (Exact



     Date)



documented as of this encounter (statuses as of 2020)



Resolved Problems







 Problem  Noted Date  Resolved Date

 

 24 weeks gestation of pregnancy  2019

 

 Vaginal discharge  2019

 

 History of urinary retention  10/11/2018  2019









 Overview: 







 After delivery









 Dysuria  10/11/2018  2019

 

 Normal vaginal delivery  2016  10/11/2018

 

 Urinary retention with incomplete bladder emptying  2016  10/11/2018









 Overview: 



Pt requiring Casas cath after delivery x 24 hrs, states occurred after last 
delivery also









 37 weeks gestation of pregnancy  2016  10/11/2018

 

 Insufficient prenatal care, third trimester  2016

 

 Threatened labor at term  2016

 

 Indication for care or intervention in labor or delivery-Active  2016  10
/2018



 Labor at Term    

 

 Antepartum anemia in third trimester  2016  10/11/2018

 

 Pelvic pressure in pregnancy, antepartum, third trimester  2016

 

 History of spontaneous , currently pregnant, third  2016



 trimester    

 

 History of anemia  2016

 

 Supervision of high-risk pregnancy with insufficient prenatal  2016



 care in third trimester    



documented as of this encounter (statuses as of 2020)



Immunizations







 Name  Administration Dates  Next Due

 

 TDAP (ADACEL) VACCINE  2019  

 

 Tdap  2016  



documented as of this encounter



Social History







 Tobacco Use  Types  Packs/Day  Years Used  Date

 

 Never Smoker        









 Smokeless Tobacco: Never Used      









 Comments: not a smoker









 Alcohol Use  Drinks/Week  oz/Week  Comments

 

 No  0 Standard drinks or equivalent  0.0  









 Pregnant  Estimated Date of Delivery  Comments

 

 Yes  2020  Based on last menstrual period of 2019 (Exact



     Date)









 Sex Assigned at Birth  Date Recorded

 

 Not on file  









 Job Start Date  Occupation  Industry

 

 Not on file  Not on file  Not on file









 Travel History  Travel Start  Travel End









 No recent travel history available.



documented as of this encounter



Last Filed Vital Signs

Not on filedocumented in this encounter



Plan of Treatment







 Date  Type  Specialty  Care Team  Description

 

 2020  Routine Prenatal Visit  [facility]  Cecilio Singh, FNP



  



       [address] [place] [address]



  



       [phone] (Fax)  









 Health Maintenance  Due Date  Last Done  Comments

 

 INFLUENZA VACCINE (#1)  2020    Postponed from 2019



       (Refused)

 

 VARICELLA VACCINES (1 of 2 -  2020    Postponed from 10/28/1991



 2-dose childhood series)      (Pregnant or



       Breastfeeding)

 

 PAP SMEAR  10/11/2021  10/11/2018  

 

 DTaP,Tdap,and Td Vaccines (3  2029,  



 - Td)    2016  

 

 PNEUMOCOCCAL 0-64 YEARS  Aged Out    No longer eligible based



 COMBINED SERIES      on patient's age to



       complete this topic



documented as of this encounter



Results

Not on filedocumented in this encounter



Visit Diagnoses







 Diagnosis

 

 Anemia of mother in pregnancy, antepartum - Primary







 Anemia, antepartum



documented in this encounter



Insurance







 Payer  Benefit Plan /  Subscriber ID  Effective  Phone  Address  Type



   Group    Dates      

 

 [facility]  xxxxxxxxx  2019-Pres    [address]  Medicaid



 HEALTH CHOICE -  HEALTH CHOICE    [facility]    [id number]



  



 MANAGED  MEDICAID        [place]  



 [place]          [id number]  



documented as of this encounter

## 2020-03-03 NOTE — XMS REPORT
Summary of Care

 Created on:2020



Patient:Dipti Donovan

Sex:Female

:1990

External Reference #:[id number]





Demographics







 Address  [address]



   [place] [address]

 

 Mobile Phone  [phone]

 

 Phone  [phone]

 

 Home Phone  [phone]

 

 Email Address  [email or web address]

 

 Preferred Language  English

 

 Marital Status  

 

 Gnosticism Affiliation  Unknown

 

 Race  White

 

 Ethnic Group   or 









Author







 Organization  [facility]

 

 Address  [address] [place] [address]









Support







 Name  Relationship  Address  Phone

 

 Albert Khoury  Unavailable  [address]  [phone]



     [place] [address]  









Care Team Providers







 Name  Role  Phone

 

 Cecilio Singh FNP  Primary Care Provider  [phone]









Reason for Visit







 Reason  Comments

 

 Prenatal Care  







Encounter Details







 Date  Type  Department  Care Team  Description

 

 2020  Routine Prenatal  [facility]-  Cecilio Singh  
Supervision of high risk pregnancy in third trimester (Primary Dx);



   Visit  SALINAS Aguirre



  History of  delivery, currently pregnant;



     [address]



  [address]  Grand multiparity



     [facility]



  



     [address]



  [place], [place]  



     [phone] [address] [phone]  



       (Fax)  







Allergies

No Known Allergiesdocumented as of this encounter (statuses as of 2020)



Medications







 Medication  Sig  Dispensed  Refills  Start Date  End Date  Status

 

 prenatal vit  Take 1 Packet by  30 Each  6  2019    Active



 33-iron-folic-dha  mouth daily.          



 (SELECT-OB + DHA) 29 mg            



 iron-1 mg -250 mg combo            



 packIndications:            



 Supervision of high            



 risk pregnancy,            



 antepartum            

 

 ferrous sulfate 325 mg  Take 1 tablet by  60 tablet  3  2020    Active



 (65 mg iron)  mouth 2 (two)          



 tabletIndications:  times daily.          



 Anemia of mother in            



 pregnancy, antepartum            

 

 ascorbic acid, vitamin  Take 1 tablet by  90 tablet  3  2020    Active



 C, 500 mg  mouth 3 (three)          



 tabletIndications:  times daily.          



 Anemia of mother in            



 pregnancy, antepartum            



documented as of this encounter (statuses as of 2020)



Active Problems







 Problem  Noted Date

 

 Death of child  2020

 

 Maternal varicella, non-immune  2019









 Overview: 







 Address in Postpartum









 Supervision of high risk pregnancy in third trimester  2019

 

 Grand multiparity  2016









 Overview: 







 Plans IUD pp









 History of  delivery, currently pregnant  2016









 Pregnant  Estimated Date of Delivery  Comments

 

 Yes  2020  Based on last menstrual period of 2019 (Exact



     Date)



documented as of this encounter (statuses as of 2020)



Resolved Problems







 Problem  Noted Date  Resolved Date

 

 24 weeks gestation of pregnancy  2019

 

 Vaginal discharge  2019

 

 History of urinary retention  10/11/2018  2019









 Overview: 







 After delivery









 Dysuria  10/11/2018  2019

 

 Normal vaginal delivery  2016  10/11/2018

 

 Urinary retention with incomplete bladder emptying  2016  10/11/2018









 Overview: 



Pt requiring Casas cath after delivery x 24 hrs, states occurred after last 
delivery also









 37 weeks gestation of pregnancy  2016  10/11/2018

 

 Insufficient prenatal care, third trimester  2016

 

 Threatened labor at term  2016

 

 Indication for care or intervention in labor or delivery-Active  2016  10
/2018



 Labor at Term    

 

 Antepartum anemia in third trimester  2016  10/11/2018

 

 Pelvic pressure in pregnancy, antepartum, third trimester  2016

 

 History of spontaneous , currently pregnant, third  2016



 trimester    

 

 History of anemia  2016

 

 Supervision of high-risk pregnancy with insufficient prenatal  2016



 care in third trimester    



documented as of this encounter (statuses as of 2020)



Immunizations







 Name  Administration Dates  Next Due

 

 TDAP (ADACEL) VACCINE  2019  

 

 Tdap  2016  



documented as of this encounter



Social History







 Tobacco Use  Types  Packs/Day  Years Used  Date

 

 Never Smoker        









 Smokeless Tobacco: Never Used      









 Comments: not a smoker









 Alcohol Use  Drinks/Week  oz/Week  Comments

 

 No  0 Standard drinks or equivalent  0.0  









 Pregnant  Estimated Date of Delivery  Comments

 

 Yes  2020  Based on last menstrual period of 2019 (Exact



     Date)









 Sex Assigned at Birth  Date Recorded

 

 Not on file  









 Job Start Date  Occupation  Industry

 

 Not on file  Not on file  Not on file









 Travel History  Travel Start  Travel End









 No recent travel history available.



documented as of this encounter



Last Filed Vital Signs







 Vital Sign  Reading  Time Taken  Comments

 

 Blood Pressure  101/63  2020 12:54 PM CST  

 

 Pulse  90  2020 12:54 PM CST  

 

 Temperature  36.4 C (97.6 F)  2020 12:54 PM CST  

 

 Respiratory Rate  16  2020 12:54 PM CST  

 

 Oxygen Saturation  -  -  

 

 Inhaled Oxygen Concentration  -  -  

 

 Weight  72.4 kg (159 lb 9 oz)  2020 12:54 PM CST  

 

 Height  154.9 cm (5' 1")  2020 12:54 PM CST  

 

 Body Mass Index  30.15  2020 12:54 PM CST  



documented in this encounter



Progress Notes

Cecilio Singh, FNP - 2020 12:45 PM CSTFormatting of this note might 
be different from the original.

Chief complaint:

Chief Complaint

Patient presents with

 Prenatal Care



HPI CC: Follow Up Prenatal Visit



Dipti Donovan is a 29 year old, , /White female. Patient's last 
menstrual period was2019 (exact date).  She is 37w1d with an intrauterine 
pregnancy.  Her estimated date of delivery is 2020, by Last Menstrual 
Period.  She has no complaints today. She reports +FM and denies contractions, 
LOF and bleeding today.  Reviewed OB/ER, PIH, labor and movement precautions. 
[facility] any signs and symptoms  of labor (regular 
contractions, LOF, vaginal bleeding or decrease fetal movement.

Patient denies current or past physical, sexual or emotional abuse.



Histories

OB History

 Para Term  AB Living

9 5 4 1 2 4

SAB TAB Ectopic Multiple Live Births

2 0 0 0 5



# Outcome Date GA Lbr Chiki/2nd Weight Sex Delivery Anes PTL Lv

9 Current

8 SAB 2018 13w0d

7 Term 16 37w6d  6 lb 12 oz (3.062 kg) F VAGINAL   PRATIBHA

6 Term 04/05/15 39w0d  6 lb 12 oz (3.062 kg) F NORMAL SPONT EPI  PRATIBHA

5  14 34w0d  4 lb 2 oz (1.871 kg) F NORMAL SPONT   PRATIBHA

   Complications: Placenta Previa

4 Term 13 39w0d  6 lb 12 oz (3.062 kg) F NORMAL SPONT EPI  PRATIBHA

3 Term 12 39w0d  7 lb 4 oz (3.289 kg) F NORMAL SPONT EPI  ND

2 SAB  12w0d

1 



Past Medical History:

Diagnosis Date

 Anemia

 ongoing, taking supplements

 Grand multiparity 2016

 Plans IUD pp

 Heart murmur

 resolved, had a heart murmur as a child

 History of anemia 2016

 Indication for care or intervention in labor or delivery-Active Labor at 
Term 2016

 Normal vaginal delivery 2016

 Transfusion history 2012, (2)

 Urinary retention with incomplete bladder emptying 2016

 Pt requiring Casas cath after delivery x 24 hrs, states occurred after last 
delivery also



Family History

Problem Relation Age of Onset

 No Significant Medical Problems Mother

 No Significant Medical Problems Father

 No Significant Medical Problems Sister

 No Significant Medical Problems Brother

 No Significant Medical Problems Maternal Aunt

 No Significant Medical Problems Maternal Uncle

 No Significant Medical Problems Paternal Aunt

 No Significant Medical Problems Paternal Uncle

 No Significant Medical Problems Maternal Grandmother

 No Significant Medical Problems Maternal Grandfather

 No Significant Medical Problems Paternal Grandmother

 No Significant Medical Problems Paternal Grandfather

 Arthritis NoFHx

 Asthma NoFHx

 Birth defects NoFHx

 Breast Cancer NoFHx

 Colon Cancer NoFHx

 Ovarian Cancer NoFHx

 Uterine Cancer NoFHx

 Cancer NoFHx

 Depression NoFHx

 Diabetes NoFHx

 Genetic NoFHx

 Heart NoFHx

 High cholesterol NoFHx

 Hypertension NoFHx

 Mental retardation NoFHx

 Neurological NoFHx

 Osteoporosis NoFHx

 Psychiatry NoFHx

 Other - see comments NoFHx



Family Status

Relation Name Status

 Mo  Alive

 Fa  Alive

 Sis  Alive

 Bro  Alive

 MAunt  (Not Specified)

 MUnc  (Not Specified)

 PAunt  (Not Specified)

 PUnc  (Not Specified)

 MGMo  Alive

 MGFa  Alive

 PGMo  Alive

 PGFa  Alive

 NoFHx  (Not Specified)



Past Surgical History:

Procedure Laterality Date

 DELIVER PLACENTA  2016



 DILATION AND CURETTAGE (SHX)  

 remove left over placentae

 FETAL MONITOR W/REPORT  2016



 OBSTETRICAL CARE,VAG DELIV ONLY  2016



 OXYTOCIN INDUCTION/AUGMENTATION REFERENCE LINK ORDER  2016



 REPAIR VAGINA/PERINEUM  2016





Social History



Socioeconomic History

 Marital status: 

  Spouse name: Not on file

 Number of children: 3

 Years of education: Not on file

 Highest education level: Not on file

Occupational History

 Occupation: none

Social Needs

 Financial resource strain: Not on file

 Food insecurity:

  Worry: Not on file

  Inability: Not on file

 Transportation needs:

  Medical: Not on file

  Non-medical: Not on file

Tobacco Use

 Smoking status: Never Smoker

 Smokeless tobacco: Never Used

 Tobacco comment: not a smoker

Substance and Sexual Activity

 Alcohol use: No

  Alcohol/week: 0.0 standard drinks

 Drug use: No

 Sexual activity: Yes

  Partners: Male

  Birth control/protection: None

  Comment: last sexual intercourse 2019

Lifestyle

 Physical activity:

  Days per week: Not on file

  Minutes per session: Not on file

 Stress: Not on file

Relationships

 Social connections:

  Talks on phone: Not on file

  Gets together: Not on file

  Attends [employer]ist service: Not on file

  Active member of club or organization: Not on file

  Attends meetings of clubs or organizations: Not on file

  Relationship status: Not on file

 Intimate partner violence:

  Fear of current or ex partner: Not on file

  Emotionally abused: Not on file

  Physically abused: Not on file

  Forced sexual activity: Not on file

Other Topics Concern

  Service Not Asked

 Blood Transfusions Yes

 Caffeine Concern Not Asked

 Occupational Exposure Not Asked

 Hobby Hazards Not Asked

 Sleep Concern Not Asked

 Stress Concern Not Asked

 Weight Concern Not Asked

 Special Diet Not Asked

 Back Care Not Asked

 Exercise Not Asked

 Bike Helmet Not Asked

 Seat Belt Not Asked

 Self-Exams Not Asked

Social History Narrative

 Pt states her Adventist preference is Gnosticism

 Patient lives with four daughters and a friend.

 Patient has no cats in the home.



Social History



Substance and Sexual Activity

Sexual Activity Yes

 Partners: Male

 Birth control/protection: None

 Comment: last sexual intercourse 2019







Labs

Labs are pending.



Radiology

No new radiology.



Allergies

Dipti has No Known Allergies.



Medications

Dipti has a current medication list which includes the following prescription(
s): ascorbic acid (vitamin c), ferrous sulfate, and prenatal vit 33-iron-folic-
dha.



Review of Systems

Constitutional: Negative for activity change, appetite change, fatigue, 
unexpected weight change, weight gain and weight loss.

HENT: Negative for sore throat.

Eyes: Negative for visual disturbance.

Respiratory: Negative for cough and shortness of breath.

Breasts: Negative for discharge, mass, pain and unequal size.

Cardiovascular: Negative for chest pain, palpitations and leg swelling.

Gastrointestinal: Negative.  Negative for abdominal pain, anal bleeding, blood 
in stool, constipation, diarrhea, nausea, rectal pain and vomiting.

Genitourinary: Negative for bladder incontinence, dysuria, urgency, flank pain, 
vaginal bleeding, vaginal discharge, genital sores, vaginal pain and pelvic 
pain.

Skin: Negative for color change and rash.

Neurological: Negative.  Negative for dizziness, syncope and headaches.

Psychiatric/Behavioral: Negative for confusion, self-injury and sleep 
disturbance. The patient is not nervous/anxious.

Hematological: Negative for cold intolerance and heat intolerance.

Endocrine: Negative for hair loss, cold intolerance, heat intolerance, weight 
gain and weight loss.



/63 (BP Location: Right arm, Patient Position: Sitting, BP CUFF SIZE: 
Adult Medium)  | Pulse 90  | Temp 36.4 C (97.6 F) (Oral)  | Resp 16  | Ht 5
' 1" (1.549 m)  | Wt 159 lb 9 oz (72.4 kg)  | LMP 2019 (Exact Date)  | 
BMI 30.15 kg/m

Pregravid BMI: Could not be calculated



Physical Exam



PRENATAL PHYSICAL:

General Exam:

Neurological: Normal

Abdomen: Normal

Extremities: Normal



Pelvic Exam:

Vagina:

    Chaperone present for the exam: Sneha Stinson RN

Cervix:

    50/-3



Membrane status: Intact

Uterus: 37cm  Weeks





Assessment/Plan

Supervision of high risk pregnancy in third trimester  (primary encounter 
diagnosis)

History of  delivery, currently pregnant

Grand multiparity

Comment: Routine Prenatal Visit

Plan: POCT URINALYSIS GLUCOSE &amp; PROTEIN

      Denies zika virus risk, signs and symptoms such as fever,rash,joint pain, 
conjunctivitis (red eyes), muscle pain, headaches; outside [place] travel to areas 
affected by zika, and FOB exposure to zika.Educated on use of mosquito 
repellent.



Return to clinic in 2 weeks.

Discussed treatment options.

Medications as ordered.

Reviewed patient instructions and provided printed copy.



This visit did not involve counseling and coordination that comprised more than 
50% of the visit time.

SALINAS Joshi  2020  2:01 PM

Electronically signed by Cecilio Singh FNP at 2020  2:01 PM 
CSTdocumented in this encounter



Plan of Treatment







 Date  Type  Specialty  Care Team  Description

 

 2020  Routine Prenatal Visit  [facility]  Cecilio Singh FNP



  



       [address] [place] [address]



  



       [phone] (Fax)  









 Health Maintenance  Due Date  Last Done  Comments

 

 INFLUENZA VACCINE (#1)  2020    Postponed from 2019



       (Refused)

 

 VARICELLA VACCINES (1 of 2 -  2020    Postponed from 10/28/1991



 2-dose childhood series)      (Pregnant or



       Breastfeeding)

 

 PAP SMEAR  10/11/2021  10/11/2018  

 

 DTaP,Tdap,and Td Vaccines (3  2029,  



 - Td)    2016  

 

 PNEUMOCOCCAL 0-64 YEARS  Aged Out    No longer eligible based



 COMBINED SERIES      on patient's age to



       complete this topic



documented as of this encounter



Procedures







 Procedure Name  Priority  Date/Time  Associated Diagnosis  Comments

 

 POCT URINALYSIS  Routine  2020 12:56  Supervision of high  Results for 
this



 GLUCOSE & PROTEIN    PM CST  risk pregnancy in  procedure are in



       third trimester  the results



         section.



documented in this encounter



Results

POCT URINALYSIS GLUCOSE &amp; PROTEIN (2020 12:56 PM CST)





 Component  Value  Ref Range  Performed At  Pathologist Signature

 

 POCT U PROT  trace  Negative - Negative    

 

 POCT U GLU  neg  Negative - Negative    









 Specimen

 

 Urine - URINE, CLEAN CATCH



documented in this encounter



Visit Diagnoses







 Diagnosis

 

 Supervision of high risk pregnancy in third trimester - Primary







 Unspecified high-risk pregnancy

 

 History of  delivery, currently pregnant







 Pregnancy with history of pre-term labor

 

 Grand multiparity



documented in this encounter



Insurance







 Payer  Benefit Plan /  Subscriber ID  Effective  Phone  Address  Type



   Group    [facility]  xxxxxxxxx  2019-Pres    [address]  Medicaid



 HEALTH CHOICE -  HEALTH CHOICE    [facility]    [id number]



  



 MANAGED  MEDICAID        [place]          [id number]  









 Guarantor Name  Account Type  Relation to  Date of Birth  Phone  Billing



     Patient      Address

 

 Dipti Donovan  Personal/Family  Self  1990  [phone]  [address]



         (Home)



  [address]







         [phone]  [facility]



         (Work)  [place] [address]



documented as of this encounter

## 2020-03-03 NOTE — XMS REPORT
Summary of Care

 Created on:2020



Patient:Dipti Donovan

Sex:Female

:1990

External Reference #:[id number]





Demographics







 Address  [address] [address] [address]



   [place] [address]

 

 Mobile Phone  [phone]

 

 Phone  [phone]

 

 Home Phone  [phone]

 

 Email Address  [email or web address]

 

 Preferred Language  English

 

 Marital Status  

 

 Baptism Affiliation  Unknown

 

 Race  White

 

 Ethnic Group   or 









Author







 Organization  [facility]

 

 Address  [address] [place] [address]









Support







 Name  Relationship  Address  Phone

 

 Albert Khoury  Unavailable  [address]  [phone]



     [place] [address]  









Care Team Providers







 Name  Role  Phone

 

 Cecilio Singh [facility]  Primary Care Provider  [phone]









Reason for Visit







 Reason  Comments

 

 Abnormal Lab  Anemia







Encounter Details







 Date  Type  Department  Care Team  Description

 

 2020  [facility]  Abnormal Lab (
Anemia)



     [facility]



  



     [address] [facility]



  [address] [place], [place] [address] [phone] (Fax)  







Allergies

No Known Allergiesdocumented as of this encounter (statuses as of 2020)



Medications







 Medication  Sig  Dispensed  Refills  Start Date  End Date  Status

 

 prenatal vit  Take 1 Packet by  30 Each  6  2019    Active



 33-iron-folic-dha  mouth daily.          



 (SELECT-OB + DHA) 29 mg            



 iron-1 mg -250 mg combo            



 packIndications:            



 Supervision of high            



 risk pregnancy,            



 antepartum            

 

 ferrous sulfate 325 mg  Take 1 tablet by  60 tablet  3  2020    Active



 (65 mg iron)  mouth 2 (two)          



 tabletIndications:  times daily.          



 Anemia of mother in            



 pregnancy, antepartum            

 

 ascorbic acid, vitamin  Take 1 tablet by  90 tablet  3  2020    Active



 C, 500 mg  mouth 3 (three)          



 tabletIndications:  times daily.          



 Anemia of mother in            



 pregnancy, antepartum            



documented as of this encounter (statuses as of 2020)



Active Problems







 Problem  Noted Date

 

 Death of child  2020

 

 Maternal varicella, non-immune  2019









 Overview: 







 Address in Postpartum









 Supervision of high risk pregnancy, antepartum  2019

 

 Grand multiparity  2016









 Overview: 







 Plans IUD pp









 History of  delivery, currently pregnant  2016









 Pregnant  Estimated Date of Delivery  Comments

 

 Yes  2020  Based on last menstrual period of 2019 (Exact



     Date)



documented as of this encounter (statuses as of 2020)



Resolved Problems







 Problem  Noted Date  Resolved Date

 

 24 weeks gestation of pregnancy  2019

 

 Vaginal discharge  2019

 

 History of urinary retention  10/11/2018  2019









 Overview: 







 After delivery









 Dysuria  10/11/2018  2019

 

 Normal vaginal delivery  2016  10/11/2018

 

 Urinary retention with incomplete bladder emptying  2016  10/11/2018









 Overview: 



Pt requiring Casas cath after delivery x 24 hrs, states occurred after last 
delivery also









 37 weeks gestation of pregnancy  2016  10/11/2018

 

 Insufficient prenatal care, third trimester  2016

 

 Threatened labor at term  2016

 

 Indication for care or intervention in labor or delivery-Active  2016  10
/2018



 Labor at Term    

 

 Antepartum anemia in third trimester  2016  10/11/2018

 

 Pelvic pressure in pregnancy, antepartum, third trimester  2016

 

 History of spontaneous , currently pregnant, third  2016



 trimester    

 

 History of anemia  2016

 

 Supervision of high-risk pregnancy with insufficient prenatal  2016



 care in third trimester    



documented as of this encounter (statuses as of 2020)



Immunizations







 Name  Administration Dates  Next Due

 

 TDAP (ADACEL) VACCINE  2019  

 

 Tdap  2016  



documented as of this encounter



Social History







 Tobacco Use  Types  Packs/Day  Years Used  Date

 

 Never Smoker        









 Smokeless Tobacco: Never Used      









 Comments: not a smoker









 Alcohol Use  Drinks/Week  oz/Week  Comments

 

 No  0 Standard drinks or equivalent  0.0  









 Pregnant  Estimated Date of Delivery  Comments

 

 Yes  2020  Based on last menstrual period of 2019 (Exact



     Date)









 Sex Assigned at Birth  Date Recorded

 

 Not on file  









 Job Start Date  Occupation  Industry

 

 Not on file  Not on file  Not on file









 Travel History  Travel Start  Travel End









 No recent travel history available.



documented as of this encounter



Last Filed Vital Signs

Not on filedocumented in this encounter



Plan of Treatment







 Date  Type  Specialty  Care Team  Description

 

 2020  Routine Prenatal Visit  [facility]  Cecilio Singh, FNP



  



       [address] [place] [address]



  



       [phone] (Fax)  









 Health Maintenance  Due Date  Last Done  Comments

 

 INFLUENZA VACCINE (#1)  2020    Postponed from 2019



       (Refused)

 

 VARICELLA VACCINES (1 of 2 -  2020    Postponed from 10/28/1991



 2-dose childhood series)      (Pregnant or



       Breastfeeding)

 

 PAP SMEAR  10/11/2021  10/11/2018  

 

 DTaP,Tdap,and Td Vaccines (3  2029,  



 - Td)    2016  

 

 PNEUMOCOCCAL 0-64 YEARS  Aged Out    No longer eligible based



 COMBINED SERIES      on patient's age to



       complete this topic



documented as of this encounter



Results

Not on filedocumented in this encounter



Visit Diagnoses







 Diagnosis

 

 Anemia of mother in pregnancy, antepartum - Primary







 Anemia, antepartum



documented in this encounter



Insurance







 Payer  Benefit Plan /  Subscriber ID  Effective  Phone  Address  Type



   Group    Dates      

 

 [facility]  xxxxxxxxx  2019-Pres    [address]  Medicaid



 HEALTH CHOICE -  HEALTH CHOICE    [facility]    [id number]



  



 MANAGED  MEDICAID        [place], [place]  



 MEDICAID          [id number]  



documented as of this encounter

## 2020-03-04 VITALS — DIASTOLIC BLOOD PRESSURE: 68 MMHG | TEMPERATURE: 99.4 F | SYSTOLIC BLOOD PRESSURE: 106 MMHG | OXYGEN SATURATION: 98 %

## 2021-10-02 ENCOUNTER — HOSPITAL ENCOUNTER (EMERGENCY)
Dept: HOSPITAL 97 - ER | Age: 31
Discharge: HOME | End: 2021-10-02
Payer: COMMERCIAL

## 2021-10-02 VITALS — SYSTOLIC BLOOD PRESSURE: 112 MMHG | DIASTOLIC BLOOD PRESSURE: 63 MMHG

## 2021-10-02 VITALS — OXYGEN SATURATION: 100 % | TEMPERATURE: 98.5 F

## 2021-10-02 DIAGNOSIS — M79.631: Primary | ICD-10-CM
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
## 2021-10-02 PROCEDURE — 2W3CX1Z IMMOBILIZATION OF RIGHT LOWER ARM USING SPLINT: ICD-10-PCS

## 2021-10-02 PROCEDURE — 99283 EMERGENCY DEPT VISIT LOW MDM: CPT

## 2021-10-02 NOTE — ER
Nurse's Notes                                                                                     

 [facility]                                                                 

Name: Dipti Donovan                                                                               

Age: 30 yrs                                                                                       

Sex: Female                                                                                       

: 1990                                                                                   

MRN: [id number]                                                                                   

Arrival Date: 10/02/2021                                                                          

Time: 19:44                                                                                       

Account#: [id number]                                                                            

Bed Treatment                                                                                     

Private MD:                                                                                       

Diagnosis: Pain in right forearm                                                                  

                                                                                                  

Presentation:                                                                                     

10/02                                                                                             

19:53 Chief complaint: Patient states: Reports hit right FA on doorknob x2 3 days ago;        lp1 

      Reports continued pain to right FA and right wrist, ROM intact. Coronavirus screen: At      

      this time, the client does not indicate any symptoms associated with coronavirus-19.        

      Ebola Screen: No symptoms or risks identified at this time. Initial Sepsis Screen: Does     

      the patient meet any 2 criteria? No. Patient's initial sepsis screen is negative. Does      

      the patient have a suspected source of infection? No. Patient's initial sepsis screen       

      is negative. Risk Assessment: Do you want to hurt yourself or someone else? Patient         

      reports no desire to harm self or others. Onset of symptoms was 2021.         

19:53 Method Of Arrival: Ambulatory                                                           lp1 

19:53 Acuity: MATTHEW 5                                                                           lp1 

                                                                                                  

OB/GYN:                                                                                           

19:55 LMP 2021                                                                           lp1 

                                                                                                  

Historical:                                                                                       

- Allergies:                                                                                      

19:55 No Known Allergies;                                                                     lp1 

- Home Meds:                                                                                      

19:55 None [Active];                                                                          lp1 

- PMHx:                                                                                           

19:55 None;                                                                                   lp1 

- PSHx:                                                                                           

19:55 tubal ligation;                                                                         lp1 

                                                                                                  

- Immunization history:: Adult Immunizations up to date.                                          

- Social history:: Smoking status: Patient denies any tobacco usage or history of.                

                                                                                                  

                                                                                                  

Screenin:08 Abuse screen: Denies threats or abuse. Nutritional screening: No deficits noted.        jb4 

      Tuberculosis screening: No symptoms or risk factors identified. Fall Risk None              

      identified.                                                                                 

                                                                                                  

Assessment:                                                                                       

20:08 General: Appears in no apparent distress. uncomfortable, Behavior is calm, cooperative, jb4 

      appropriate for age. Pain: Complains of pain in dorsum of right hand and dorsal aspect      

      of right forearm Pain does not radiate. Pain currently is 6 out of 10 on a pain scale.      

      Quality of pain is described as throbbing. Neuro: Level of Consciousness is awake,          

      alert, obeys commands, Oriented to person, place, time, situation. Cardiovascular:          

      Patient's skin is warm and dry. Respiratory: Airway is patent Respiratory effort is         

      even, unlabored, Respiratory pattern is regular, symmetrical. GI: No signs and/or           

      symptoms were reported involving the gastrointestinal system. : No signs and/or           

      symptoms were reported regarding the genitourinary system. EENT: No signs and/or            

      symptoms were reported regarding the EENT system. Derm: Skin is intact, Skin is pink,       

      warm \T\ dry. Musculoskeletal: Circulation, motion, and sensation intact. Range of          

      motion: intact in all extremities.                                                          

21:22 Reassessment: Patient appears in no apparent distress at this time. Patient and/or      jb4 

      family updated on plan of care and expected duration. Pain level reassessed. Patient is     

      alert, oriented x 3, equal unlabored respirations, skin warm/dry/pink.                      

                                                                                                  

Vital Signs:                                                                                      

19:53  / 95; Pulse 129; Resp 16; Temp 98.5(TE); Pulse Ox 100% on R/A; Weight 68.04 kg   lp1 

      (R); Height 5 ft. 1 in. (154.94 cm); Pain 7/10;                                             

20:10  / 76; Pulse 115; Resp 16; Pulse Ox 100% ;                                        jb4 

21:22  / 63; Pulse 94; Resp 16; Pulse Ox 100% on R/A;                                   jb4 

19:53 Body Mass Index 28.34 (68.04 kg, 154.94 cm)                                             lp1 

                                                                                                  

ED Course:                                                                                        

19:44 Patient arrived in ED.                                                                  bp1 

19:55 Triage completed.                                                                       lp1 

19:55 Arm band placed on.                                                                     lp1 

20:05 [facility] Mens[facility]                                                                cp  

20:05 Roman Allen MD is Attending Physician.                                             cp  

20:08 Kem Aguirre, RN is Primary Nurse.                                                     jb4 

20:08 Awaiting ED provider evaluation.                                                        jb4 

20:08 Patient has correct armband on for positive identification. Bed in low position. Call   jb4 

      light in reach. Side rails up X 1. Pillow given. Ice pack to injury.                        

20:10 Nurse Practitioner and/or Physician Assistant to see patient.                           jb4 

20:26 XRAY Forearm RIGHT In Process Unspecified.                                              EDMS

21:22 No provider procedures requiring assistance completed. Patient did not have IV access   jb4 

      during this emergency room visit.                                                           

                                                                                                  

Administered Medications:                                                                         

20:18 Not Given (Other Intervention Used): Ibuprofen 800 mg PO once                           jb4 

20:18 Not Given (Other Intervention Used): Tylenol 1000 mg PO once                            jb4 

20:18 Drug: Acetaminophen-Codeine (300 mg-30 mg) 2 tabs Route: PO;                            jb4 

21:19 Follow up: Response: No adverse reaction; RASS: Alert and Calm (0)                      jb4 

21:22 Drug: Motrin (ibuprofen) 800 mg Route: PO;                                              jb4 

21:22 Follow up: Response: Medication administered at discharge.                              jb4 

                                                                                                  

                                                                                                  

Outcome:                                                                                          

21:03 Discharge ordered by MD.                                                                cp  

21:22 Discharged to home ambulatory, with friend.                                             jb4 

21:22 Condition: stable                                                                           

21:22 Discharge instructions given to patient, Instructed on discharge instructions, follow       

      up and referral plans. medication usage, Demonstrated understanding of instructions,        

      follow-up care, medications, Prescriptions given X 1.                                       

21:23 Patient left the ED.                                                                    jb4 

                                                                                                  

Signatures:                                                                                       

Dispatcher [employer]                           EDAyleen Osborn, RN                         RN   lp1                                                  

José Manuel Mensah PA PA cp Bryson, James, RN                       RN   jb4                                                  

Peyton Jimenez                                                  

                                                                                                  

**************************************************************************************************

## 2021-10-02 NOTE — RAD REPORT
EXAM DESCRIPTION:  RAD - Forearm Right - 10/2/2021 8:26 pm

 

CLINICAL HISTORY:  PAIN

 

COMPARISON:  No comparisons

 

FINDINGS:  No acute fracture. No malalignment. No significant focal degenerative changes.

 

IMPRESSION:  No acute osseous abnormality involving the right forearm.

## 2021-10-02 NOTE — EDPHYS
Physician Documentation                                                                           

 [facility]                                                                 

Name: Dipti Donovan                                                                               

Age: 30 yrs                                                                                       

Sex: Female                                                                                       

: 1990                                                                                   

MRN: [id number]                                                                                   

Arrival Date: 10/02/2021                                                                          

Time: 19:44                                                                                       

Account#: [id number]                                                                            

Bed Treatment                                                                                     

Private MD:                                                                                       

ED Physician Roman Allen                                                                      

HPI:                                                                                              

10/02                                                                                             

20:12 This 30 yrs old  Female presents to ER via Ambulatory with complaints of Arm    cp  

      Pain, Arm Injury, - RT.                                                                     

                                                                                                  

OB/GYN:                                                                                           

19:55 LMP 2021                                                                           lp1 

                                                                                                  

Historical:                                                                                       

- Allergies:                                                                                      

19:55 No Known Allergies;                                                                     lp1 

- Home Meds:                                                                                      

19:55 None [Active];                                                                          lp1 

- PMHx:                                                                                           

19:55 None;                                                                                   lp1 

- PSHx:                                                                                           

19:55 tubal ligation;                                                                         lp1 

                                                                                                  

- Immunization history:: Adult Immunizations up to date.                                          

- Social history:: Smoking status: Patient denies any tobacco usage or history of.                

                                                                                                  

                                                                                                  

ROS:                                                                                              

20:20 MS/extremity: Positive for pain, tenderness, of the right forearm, Negative for         cp  

      decreased range of motion, deformity.                                                       

                                                                                                  

Exam:                                                                                             

20:25 Constitutional: The patient appears in no acute distress, alert, awake, well developed, cp  

      well nourished.                                                                             

20:25 Head/Face:  Normocephalic, atraumatic.                                                  cp  

20:25 Cardiovascular: Rate: tachycardic.                                                          

20:25 Respiratory: the patient does not display signs of respiratory distress,  Respirations:     

      normal, no use of accessory muscles, no retractions, labored breathing, is not present.     

20:25 Musculoskeletal/extremity: Extremities: grossly normal except: noted in the dorsal          

      aspect of right forearm: tenderness, There is no evidence of  decreased ROM, deformity,     

      ecchymosis, swelling, ROM: limited passive range of motion due to pain, in the right        

      wrist, Pulses: noted to be 2+ in the right radial artery, the  right arm Sensation          

      intact.                                                                                     

20:25 Skin: cellulitis, is not appreciated, no rash present.                                      

20:25 Neuro: Motor: is normal, Sensation: is normal.                                              

                                                                                                  

Vital Signs:                                                                                      

19:53  / 95; Pulse 129; Resp 16; Temp 98.5(TE); Pulse Ox 100% on R/A; Weight 68.04 kg   lp1 

      (R); Height 5 ft. 1 in. (154.94 cm); Pain 7/10;                                             

20:10  / 76; Pulse 115; Resp 16; Pulse Ox 100% ;                                        jb4 

21:22  / 63; Pulse 94; Resp 16; Pulse Ox 100% on R/A;                                   jb4 

19:53 Body Mass Index 28.34 (68.04 kg, 154.94 cm)                                             lp1 

                                                                                                  

Procedures:                                                                                       

21:20 Splinting: Splint applied to right forearm using Orthoglass splint, applied by nurse.   cp  

      Examined by me, post splint application: neurovascular intact, Patient tolerated well.      

                                                                                                  

MDM:                                                                                              

20:11 Patient medically screened.                                                             cp  

21:02 Data reviewed: vital signs, nurses notes, radiologic studies, plain films.              cp  

21:02 Differential diagnosis: closed fracture, contusion. Test interpretation: by ED          cp  

      physician or midlevel provider: plain radiologic studies. Counseling: I had a detailed      

      discussion with the patient and/or guardian regarding: the historical points, exam          

      findings, and any diagnostic results supporting the discharge/admit diagnosis,              

      radiology results, to return to the emergency department if symptoms worsen or persist      

      or if there are any questions or concerns that arise at home.                               

                                                                                                  

10/02                                                                                             

20:11 Order name: XRAY Forearm RIGHT; Complete Time: 21:00                                    cp  

10/02                                                                                             

21:02 Order name: Splint: volar forearm splint; Complete Time: 21:19                          cp  

                                                                                                  

Administered Medications:                                                                         

20:18 Not Given (Other Intervention Used): Ibuprofen 800 mg PO once                           jb4 

20:18 Not Given (Other Intervention Used): Tylenol 1000 mg PO once                            jb4 

20:18 Drug: Acetaminophen-Codeine (300 mg-30 mg) 2 tabs Route: PO;                            jb4 

21:19 Follow up: Response: No adverse reaction; RASS: Alert and Calm (0)                      jb4 

21:22 Drug: Motrin (ibuprofen) 800 mg Route: PO;                                              jb4 

21:22 Follow up: Response: Medication administered at discharge.                              jb4 

                                                                                                  

                                                                                                  

Disposition:                                                                                      

21:30 Chart complete.                                                                         cp  

10/03                                                                                             

07:04 Co-signature as Attending Physician, Roman Allen MD.                                 mh7 

                                                                                                  

Disposition Summary:                                                                              

10/02/21 21:03                                                                                    

Discharge Ordered                                                                                 

      Location: Home                                                                          cp  

      Problem: new                                                                            cp  

      Symptoms: have improved                                                                 cp  

      Condition: Stable                                                                       cp  

      Diagnosis                                                                                   

        - Pain in right forearm                                                               cp  

      Followup:                                                                               cp  

        - With: Private Physician                                                                  

        - When: 2 - 3 days                                                                         

        - Reason: Recheck today's complaints                                                       

      Discharge Instructions:                                                                     

        - Discharge Summary Sheet                                                             cp  

        - Musculoskeletal Pain                                                                cp  

      Forms:                                                                                      

        - Medication Reconciliation Form                                                      cp  

        - Thank You Letter                                                                    cp  

        - Antibiotic Education                                                                cp  

        - Prescription Opioid Use                                                             cp  

      Prescriptions:                                                                              

        - Diclofenac Sodium 75 mg Oral Tablet Sustained Release                                    

            - take 1 tablet by ORAL route 2 times per day; 30 tablet; Refills: 0, Product     cp  

      Selection Permitted                                                                         

Signatures:                                                                                       

Dispatcher [employer]                           Ayleen Fritz RN                         RN   lp1                                                  

José Manuel Mensah PA PA cp Bryson, James, RN                       RN   jb4                                                  

Roman Allen MD MD   mh7                                                  

                                                                                                  

**************************************************************************************************

## 2022-12-08 ENCOUNTER — HOSPITAL ENCOUNTER (EMERGENCY)
Dept: HOSPITAL 97 - ER | Age: 32
Discharge: HOME | End: 2022-12-08
Payer: COMMERCIAL

## 2022-12-08 VITALS — SYSTOLIC BLOOD PRESSURE: 108 MMHG | DIASTOLIC BLOOD PRESSURE: 58 MMHG | TEMPERATURE: 101.9 F | OXYGEN SATURATION: 100 %

## 2022-12-08 DIAGNOSIS — J02.0: ICD-10-CM

## 2022-12-08 DIAGNOSIS — Z20.822: ICD-10-CM

## 2022-12-08 DIAGNOSIS — J10.1: Primary | ICD-10-CM

## 2022-12-08 LAB — SARS-COV-2 RNA RESP QL NAA+PROBE: NEGATIVE

## 2022-12-08 PROCEDURE — 99283 EMERGENCY DEPT VISIT LOW MDM: CPT

## 2022-12-08 PROCEDURE — 87081 CULTURE SCREEN ONLY: CPT

## 2022-12-08 PROCEDURE — 0240U: CPT

## 2022-12-08 NOTE — ER
Nurse's Notes                                                                                     

 [facility]                                                                 

Name: Dipti Donovan                                                                               

Age: 32 yrs                                                                                       

Sex: Female                                                                                       

: 1990                                                                                   

MRN: [id number]                                                                                   

Arrival Date: 2022                                                                          

Time: 16:48                                                                                       

Account#: [id number]                                                                            

Bed 9                                                                                             

Private MD:                                                                                       

Diagnosis: Streptococcal pharyngitis;Influenza due to identified novel influenza A virus          

                                                                                                  

Presentation:                                                                                     

                                                                                             

17:06 Chief complaint: Patient states: she started having fever, sore throat, ear pain, and   ap3 

      body aches today. Coronavirus screen: Client presents with at least one sign or symptom     

      that may indicate coronavirus-19. Ebola Screen: No symptoms or risks identified at this     

      time. Risk Assessment: Do you want to hurt yourself or someone else? Patient reports no     

      desire to harm self or others. Onset of symptoms was 2022.                     

17:06 Method Of Arrival: Ambulatory                                                           ap3 

17:09 Initial Sepsis Screen: Does the patient meet any 2 criteria? Temp <36.0*C (96.8*F)) or  ap3 

      > 38.3*C (100.9*F). HR > 90 bpm. Yes Does the patient have a suspected source of            

      infection? No. Patient's initial sepsis screen is negative.                                 

17:09 Acuity: MATTHEW 3                                                                           ap3 

                                                                                                  

Triage Assessment:                                                                                

17:08 General: Appears ill, Behavior is calm, cooperative, appropriate for age, Reports       ap3 

      chills for fever for feeling ill for fatigue for. Pain: Complains of pain in body aches     

      and throat. EENT: Reports pain when swallowing. Neuro: Level of Consciousness is awake,     

      alert, obeys commands, Oriented to person, place, time, situation. Cardiovascular:          

      Patient's skin is warm and dry. Respiratory: Airway is patent Respiratory effort is         

      even, unlabored, Respiratory pattern is regular, symmetrical.                               

                                                                                                  

OB/GYN:                                                                                           

17:09 LMP 2022                                                                          ap3 

                                                                                                  

Historical:                                                                                       

- Allergies:                                                                                      

17:08 No Known Allergies;                                                                     ap3 

- Home Meds:                                                                                      

17:08 None [Active];                                                                          ap3 

- PMHx:                                                                                           

17:08 None;                                                                                   ap3 

- PSHx:                                                                                           

17:08 tubal ligation;                                                                         ap3 

                                                                                                  

- Immunization history:: Client reports receiving the 2nd dose of the Covid vaccine.              

- Social history:: Smoking status: Patient denies any tobacco usage or history of.                

                                                                                                  

                                                                                                  

Screenin:09 Abuse screen: Denies threats or abuse. Nutritional screening: No deficits noted.        ap3 

      Tuberculosis screening: No symptoms or risk factors identified. Fall Risk None              

      identified.                                                                                 

                                                                                                  

Vital Signs:                                                                                      

17:09  / 58 RA (auto/reg); Pulse 123; Resp 18; Temp 101.9; Pulse Ox 100% ; Weight 61.23 ap3 

      kg; Height 5 ft. 1 in. (154.94 cm);                                                         

17:09 Body Mass Index 25.51 (61.23 kg, 154.94 cm)                                             ap3 

                                                                                                  

ED Course:                                                                                        

16:48 Patient arrived in ED.                                                                  as  

17:09 Arm band placed on left wrist.                                                          ap3 

17:11 Triage completed.                                                                       ap3 

17:13 [facility]                                                            sb4 

17:13 Parish Meek MD is Attending Physician.                                                sb4 

20:07 Strep Sent.                                                                             hb  

20:07 COVID-19/FLU A+B Sent.                                                                  hb  

21:13 Pauline Munguia is Primary Nurse.                                                         tw5 

                                                                                                  

Administered Medications:                                                                         

20:07 Drug: Tylenol 1000 mg Route: PO;                                                        hb  

                                                                                                  

                                                                                                  

Outcome:                                                                                          

21:10 Discharge ordered by MD.                                                                sb4 

21:27 Discharged to home ambulatory.                                                          hb  

21:27 Condition: stable                                                                           

21:27 Discharge instructions given to patient, Instructed on discharge instructions, follow       

      up and referral plans. medication usage, Demonstrated understanding of instructions,        

      follow-up care, medications, Prescriptions given X 2.                                       

21:27 Patient left the ED.                                                                    hb  

                                                                                                  

Signatures:                                                                                       

Juana Berrios Heather RN                     RN                                                      

Shaunna Jerez RN                    RN   ap3                                                  

Pauline Munguia                                tw5                                                  

Lisa Haider PA-C PA-C sb4                                                  

                                                                                                  

**************************************************************************************************

## 2022-12-08 NOTE — EDPHYS
Physician Documentation                                                                           

 [facility]                                                                 

Name: Dipti Donovan                                                                               

Age: 32 yrs                                                                                       

Sex: Female                                                                                       

: 1990                                                                                   

MRN: [id number]                                                                                   

Arrival Date: 2022                                                                          

Time: 16:48                                                                                       

Account#: [id number]                                                                            

Bed 9                                                                                             

Private MD:                                                                                       

ED Physician Parish Meek                                                                         

HPI:                                                                                              

                                                                                             

17:23 This 32 yrs old  Female presents to ER via Ambulatory with complaints of Fever, sb4 

      Sore Throat, Ear Pain.                                                                      

17:23 Patient is a 32 year old female otherwise healthy who presented to the ED with          sb4 

      complaints of fever, sore throat, ear pain, body aches for the past 3-4 days. She           

      denies sick contacts although her 2 year old son is here with her as a patient for          

      similar symptoms. She reports taking tylenol and motrin at home. Denies cough,              

      shortness of breath, nausea, vomiting, diarrhea..                                           

                                                                                                  

OB/GYN:                                                                                           

17:09 LMP 2022                                                                          ap3 

                                                                                                  

Historical:                                                                                       

- Allergies:                                                                                      

17:08 No Known Allergies;                                                                     ap3 

- Home Meds:                                                                                      

17:08 None [Active];                                                                          ap3 

- PMHx:                                                                                           

17:08 None;                                                                                   ap3 

- PSHx:                                                                                           

17:08 tubal ligation;                                                                         ap3 

                                                                                                  

- Immunization history:: Client reports receiving the 2nd dose of the Covid vaccine.              

- Social history:: Smoking status: Patient denies any tobacco usage or history of.                

                                                                                                  

                                                                                                  

ROS:                                                                                              

17:23 Eyes: Negative for injury, pain, redness, and discharge, Cardiovascular: Negative for   sb4 

      chest pain, palpitations, and edema, Respiratory: Negative for shortness of breath,         

      cough, wheezing, and pleuritic chest pain, Abdomen/GI: Negative for abdominal pain,         

      nausea, vomiting, diarrhea, and constipation, MS/Extremity: Negative for injury and         

      deformity, Skin: Negative for injury, rash, and discoloration.                              

17:23 Constitutional: Positive for body aches, fever, malaise.                                    

17:23 ENT: Positive for ear pain, sore throat.                                                    

                                                                                                  

Exam:                                                                                             

17:23 Constitutional:  This is a well developed, well nourished patient who is awake, alert,  sb4 

      and in no acute distress. Head/Face:  Normocephalic, atraumatic. Eyes:  Pupils equal        

      round and reactive to light, extra-ocular motions intact.  Periorbital areas with no        

      swelling, redness, or edema. Respiratory:  Lungs have equal breath sounds bilaterally,      

      clear to auscultation and percussion.  No rales, rhonchi or wheezes noted.  No              

      increased work of breathing, no retractions or nasal flaring. Abdomen/GI:  Soft,            

      non-tender, no distension. Skin:  Warm, dry with normal turgor.  Normal color with no       

      rashes, no lesions, and no evidence of cellulitis.                                          

17:23 ENT: Ear canal(s): erythema, Posterior pharynx: erythema, that is mild.                     

17:23 Cardiovascular: Rate: tachycardic.                                                          

                                                                                                  

Vital Signs:                                                                                      

17:09  / 58 RA (auto/reg); Pulse 123; Resp 18; Temp 101.9; Pulse Ox 100% ; Weight 61.23 ap3 

      kg; Height 5 ft. 1 in. (154.94 cm);                                                         

17:09 Body Mass Index 25.51 (61.23 kg, 154.94 cm)                                             ap3 

                                                                                                  

MDM:                                                                                              

17:13 Patient medically screened.                                                             sb4 

21:51 Data reviewed: vital signs, lab test result(s), Flu: and as a result, I will discharge  sb4 

      patient.                                                                                    

                                                                                                  

                                                                                             

17:21 Order name: COVID-19/FLU A+B; Complete Time: 21:12                                      sb4 

                                                                                             

17:21 Order name: Strep; Complete Time: 20:53                                                 sb4 

                                                                                                  

Administered Medications:                                                                         

20:07 Drug: Tylenol 1000 mg Route: PO;                                                        hb  

                                                                                                  

                                                                                                  

Disposition Summary:                                                                              

22 21:10                                                                                    

Discharge Ordered                                                                                 

      Location: Home                                                                          sb4 

      Problem: new                                                                            sb4 

      Symptoms: are unchanged                                                                 sb4 

      Condition: Stable                                                                       sb4 

      Diagnosis                                                                                   

        - Streptococcal pharyngitis                                                           sb4 

        - Influenza due to identified novel influenza A virus                                 sb4 

      Followup:                                                                               sb4 

        - With: Private Physician                                                                  

        - When: 2 - 3 days                                                                         

        - Reason: Recheck today's complaints, Continuance of care, Re-evaluation by your           

      physician                                                                                   

      Discharge Instructions:                                                                     

        - Discharge Summary Sheet                                                             sb4 

        - Strep Throat, Adult, Easy-to-Read                                                   sb4 

        - Influenza, Adult, Easy-to-Read                                                      sb4 

      Forms:                                                                                      

        - Medication Reconciliation Form                                                      sb4 

        - Thank You Letter                                                                    sb4 

        - Antibiotic Education                                                                sb4 

        - Prescription Opioid Use                                                             sb4 

      Prescriptions:                                                                              

        - Amoxicillin 500 mg Oral Capsule                                                          

            - take 1 capsule by ORAL route every 12 hours for 10 days; 20 tablet; Refills: 0, sb4 

      Product Selection Permitted                                                                 

        - Tamiflu 30 mg Oral Capsule                                                               

            - take 1 capsule by ORAL route every 12 hours for 5 days; 10 capsule; Refills: 0, sb4 

      Product Selection Permitted                                                                 

Addendum:                                                                                         

2022                                                                                        

     19:08 Co-signature as Attending Physician, Parish Meek MD.                                    r
n

                                                                                                  

Signatures:                                                                                       

Dispatcher [employer]                           EDParish Barrios MD MD rn Baxter, Heather, RN                     Shaunna Ortiz RN RN ap3 Brown, Sophia, JAYESH MCCONNELL sb4                                                  

                                                                                                  

**************************************************************************************************

## 2023-05-18 ENCOUNTER — HOSPITAL ENCOUNTER (EMERGENCY)
Dept: HOSPITAL 97 - ER | Age: 33
Discharge: HOME | End: 2023-05-18
Payer: COMMERCIAL

## 2023-05-18 VITALS — DIASTOLIC BLOOD PRESSURE: 90 MMHG | TEMPERATURE: 97.9 F | SYSTOLIC BLOOD PRESSURE: 122 MMHG

## 2023-05-18 VITALS — OXYGEN SATURATION: 100 %

## 2023-05-18 DIAGNOSIS — N30.01: Primary | ICD-10-CM

## 2023-05-18 LAB
BUN BLD-MCNC: 11 MG/DL (ref 7–18)
GLUCOSE SERPLBLD-MCNC: 96 MG/DL (ref 74–106)
HCT VFR BLD CALC: 41.1 % (ref 36–45)
LYMPHOCYTES # SPEC AUTO: 1 K/UL (ref 0.7–4.9)
MCV RBC: 76 FL (ref 80–100)
PMV BLD: 7.9 FL (ref 7.6–11.3)
POTASSIUM SERPL-SCNC: 3.6 MEQ/L (ref 3.5–5.1)
RBC # BLD: 5.41 M/UL (ref 3.86–4.86)

## 2023-05-18 PROCEDURE — 96374 THER/PROPH/DIAG INJ IV PUSH: CPT

## 2023-05-18 PROCEDURE — 96375 TX/PRO/DX INJ NEW DRUG ADDON: CPT

## 2023-05-18 PROCEDURE — 87086 URINE CULTURE/COLONY COUNT: CPT

## 2023-05-18 PROCEDURE — 81025 URINE PREGNANCY TEST: CPT

## 2023-05-18 PROCEDURE — 36415 COLL VENOUS BLD VENIPUNCTURE: CPT

## 2023-05-18 PROCEDURE — 85025 COMPLETE CBC W/AUTO DIFF WBC: CPT

## 2023-05-18 PROCEDURE — 80048 BASIC METABOLIC PNL TOTAL CA: CPT

## 2023-05-18 PROCEDURE — 87088 URINE BACTERIA CULTURE: CPT

## 2023-05-18 PROCEDURE — 99284 EMERGENCY DEPT VISIT MOD MDM: CPT

## 2023-05-18 PROCEDURE — 74177 CT ABD & PELVIS W/CONTRAST: CPT

## 2023-05-18 PROCEDURE — 81001 URINALYSIS AUTO W/SCOPE: CPT

## 2023-05-18 NOTE — EDPHYS
Physician Documentation                                                                           

 [facility]                                                                 

Name: Dipti Donovan                                                                               

Age: 32 yrs                                                                                       

Sex: Female                                                                                       

: 1990                                                                                   

MRN: [id number]                                                                                   

Arrival Date: 2023                                                                          

Time: 07:58                                                                                       

Account#: [id number]                                                                            

Bed 15                                                                                            

Private MD:                                                                                       

ED Physician Martin New                                                                     

HPI:                                                                                              

                                                                                             

09:39 This 32 yrs old  Female presents to ER via Ambulatory with complaints of Pain   kb  

      With Urination, Urinary Problem - blood.                                                    

09:39 The patient presents with urinary symptoms, dysuria, hematuria. Onset: The              kb  

      symptoms/episode began/occurred 2 day(s) ago. Modifying factors: The symptoms are           

      alleviated by nothing, the symptoms are aggravated by urinating. Associated signs and       

      symptoms: Pertinent positives: dysuria, hematuria, Pertinent negatives: fever. Severity     

      of symptoms: At their worst the symptoms were moderate, in the emergency department the     

      symptoms are unchanged. The patient has not experienced similar symptoms in the past.       

      The patient has not recently seen a physician. Pt reports burning with urination for        

      2-3 days, hematuria this morning. Reports right flank pain that has been on and off for     

      about 2 weeks and lower abd pain. .                                                         

                                                                                                  

Historical:                                                                                       

- Allergies:                                                                                      

08:10 No Known Allergies;                                                                     ll1 

- PMHx:                                                                                           

08:10 None;                                                                                   ll1 

- PSHx:                                                                                           

08:10 tubal ligation;                                                                         ll1 

                                                                                                  

- Immunization history:: Client reports receiving the 2nd dose of the Covid vaccine.              

- Social history:: Smoking status: Patient denies any tobacco usage or history of.                

                                                                                                  

                                                                                                  

ROS:                                                                                              

09:36 Constitutional: Negative for fever, chills, and weight loss.                            kb  

09:36 Abdomen/GI: Positive for abdominal pain, of the suprapubic area, Negative for nausea,       

      vomiting, and diarrhea.                                                                     

09:36 : Positive for flank pain, hematuria, burning with urination.                             

09:36 All other systems are negative.                                                             

                                                                                                  

Exam:                                                                                             

09:36 Constitutional:  This is a well developed, well nourished patient who is awake, alert,  kb  

      and in no acute distress. Head/Face:  Normocephalic, atraumatic. ENT:  Moist Mucous         

      membranes Cardiovascular:  Regular rate and rhythm with a normal S1 and S2.  No             

      gallops, murmurs, or rubs.  No pulse deficits. Respiratory:  Respirations even and          

      unlabored. No increased work of breathing. Talking in full sentences Skin:  Warm, dry       

      with normal turgor.  Normal color. MS/ Extremity:  Pulses equal, no cyanosis.               

      Neurovascular intact.  Full, normal range of motion. Neuro:  Awake and alert, GCS 15,       

      oriented to person, place, time, and situation. Moves all extremities. Normal gait.         

      Psych:  Awake, alert, with orientation to person, place and time.  Behavior, mood, and      

      affect are within normal limits.                                                            

09:36 Abdomen/GI: Inspection: abdomen appears normal, Bowel sounds: normal, Palpation: soft,      

      in all quadrants, mild abdominal tenderness, in the suprapubic area and right lower         

      quadrant.                                                                                   

09:36 Back: CVA tenderness, that is mild, is noted on the right.                                  

                                                                                                  

Vital Signs:                                                                                      

08:10  / 90; Pulse 90; Resp 16; Temp 97.9; Pulse Ox 100% on R/A; Pain 5/10;             ll1 

09:19  / 75; Pulse 87; Resp 16; Pulse Ox 100% ;                                         ko1 

08:10 Pain Scale: Adult                                                                       ll1 

                                                                                                  

MDM:                                                                                              

08:02 Patient medically screened.                                                             kb  

09:38 Differential diagnosis: appendicitis, kidney stone, nonspecific abdominal pain, ovarian kb  

      cyst, urinary tract infection, cystitis, pyelonephritis. Data reviewed: vital signs,        

      nurses notes. Counseling: I had a detailed discussion with the patient and/or guardian      

      regarding: the historical points, exam findings, and any diagnostic results supporting      

      the discharge/admit diagnosis, lab results, radiology results, the need for outpatient      

      follow up, a family practitioner, to return to the emergency department if symptoms         

      worsen or persist or if there are any questions or concerns that arise at home.             

                                                                                                  

                                                                                             

08:09 Order name: Pregnancy Test, Urine; Complete Time: 08:49                                 kb  

                                                                                             

08:09 Order name: Urinalysis w/ reflexes; Complete Time: 08:49                                kb  

                                                                                             

08:49 Order name: Urine Culture                                                               EDMS

                                                                                             

08:50 Order name: CBC with Diff; Complete Time: 09:08                                         kb  

                                                                                             

08:50 Order name: Basic Metabolic Panel; Complete Time: 09:21                                 kb  

                                                                                             

08:50 Order name: CT Abd/Pelvis - IV Contrast Only; Complete Time: 09:32                      kb  

                                                                                             

08:50 Order name: IV Saline Lock; Complete Time: 09:00                                        kb  

                                                                                             

08:50 Order name: Labs collected and sent; Complete Time: 09:00                               kb  

                                                                                                  

Administered Medications:                                                                         

09:50 Drug: Rocephin IV 1 grams Route: IV; Rate: calculated rate; Site: right antecubital;    ko1 

09:50 Drug: Ketorolac IVP 15 mg Route: IVP; Site: right antecubital;                          ko1 

                                                                                                  

                                                                                                  

Disposition:                                                                                      

11:22 Co-signature as Attending Physician, Martin New MD I reviewed the patient's care   rt  

      provided by the Advanced Practice Provider and agree with the diagnosis and treatment       

      plan.                                                                                       

                                                                                                  

Disposition Summary:                                                                              

23 09:40                                                                                    

Discharge Ordered                                                                                 

      Location: Home                                                                          kb  

      Condition: Stable                                                                       kb  

      Diagnosis                                                                                   

        - Acute cystitis with hematuria                                                       kb  

      Followup:                                                                               kb  

        - With: Emergency Department                                                               

        - When: As needed                                                                          

        - Reason: Worsening of condition                                                           

      Followup:                                                                               kb  

        - With: Private Physician                                                                  

        - When: 2 - 3 days                                                                         

        - Reason: Recheck today's complaints, Continuance of care, Re-evaluation by your           

      physician                                                                                   

      Discharge Instructions:                                                                     

        - Discharge Summary Sheet                                                             kb  

        - Urinary Tract Infection, Adult, Easy-to-Read                                        kb  

      Forms:                                                                                      

        - Medication Reconciliation Form                                                      kb  

        - Thank You Letter                                                                    kb  

        - Antibiotic Education                                                                kb  

        - Prescription Opioid Use                                                             kb  

      Prescriptions:                                                                              

        - Augmentin 875-125 mg Oral Tablet                                                         

            - take 1 tablet by ORAL route every 12 hours for 10 days; 20 tablet; Refills: 0,  kb  

      Product Selection Permitted                                                                 

Signatures:                                                                                       

Dispatcher [employer]                           Meron Cameron FNP-C FNP-Kenroy Singleton, RN                       RN   ll1                                                  

Sirisha Bee RN                       RN   ko1                                                  

Martin New MD MD   rt                                                   

                                                                                                  

**************************************************************************************************

## 2023-05-18 NOTE — ER
Nurse's Notes                                                                                     

 [facility]                                                                 

Name: Dipti Donovan                                                                               

Age: 32 yrs                                                                                       

Sex: Female                                                                                       

: 1990                                                                                   

MRN: [id number]                                                                                   

Arrival Date: 2023                                                                          

Time: 07:58                                                                                       

Account#: [id number]                                                                            

Bed 15                                                                                            

Private MD:                                                                                       

Diagnosis: Acute cystitis with hematuria                                                          

                                                                                                  

Presentation:                                                                                     

                                                                                             

08:10 Chief complaint: Patient states: Dysuria for 2-3 days. Blood in urine started today. No ll1 

      fever. Coronavirus screen: Vaccine status: Patient reports receiving the 2nd dose of        

      the covid vaccine. Client denies travel out of the [place] in the last 14 days. At this        

      time, the client does not indicate any symptoms associated with coronavirus-19. Ebola       

      Screen: Patient denies travel to an Ebola-affected area in the 21 days before illness       

      onset. Initial Sepsis Screen: Does the patient meet any 2 criteria? No. Patient's           

      initial sepsis screen is negative. Does the patient have a suspected source of              

      infection? Yes: Dysuria/Frequency/Urgency/UTI. Risk Assessment: Do you want to hurt         

      yourself or someone else? Patient reports no desire to harm self or others. Onset of        

      symptoms was May 16, 2023.                                                                  

08:10 Method Of Arrival: Ambulatory                                                           ll1 

08:10 Acuity: MATTHEW 4                                                                           ll1 

                                                                                                  

Triage Assessment:                                                                                

08:12 General: Appears in no apparent distress. Behavior is calm, cooperative, appropriate    ll1 

      for age. Pain: Complains of pain in pelvis Pain currently is 5 out of 10 on a pain          

      scale. Quality of pain is described as aching. : Reports burning with urination, pain     

      with urination, urgency, blood in urine.                                                    

                                                                                                  

Historical:                                                                                       

- Allergies:                                                                                      

08:10 No Known Allergies;                                                                     ll1 

- PMHx:                                                                                           

08:10 None;                                                                                   ll1 

- PSHx:                                                                                           

08:10 tubal ligation;                                                                         ll1 

                                                                                                  

- Immunization history:: Client reports receiving the 2nd dose of the Covid vaccine.              

- Social history:: Smoking status: Patient denies any tobacco usage or history of.                

                                                                                                  

                                                                                                  

Screenin:17 [facility] ED Fall Risk Assessment (Adult) History of falling in the last 3 months,       ko1 

      including since admission No falls in past 3 months (0 pts) Confusion or Disorientation     

      No (0 pts) Intoxicated or Sedated No (0 pts) Impaired Gait No (0 pts) Mobility Assist       

      Device Used No (0 pt) Altered Elimination No (0 pt) Score/Fall Risk Level 0 - 2 = Low       

      Risk Oriented to surroundings, Maintained a safe environment, Educated pt \T\ family on     

      fall prevention, incl call for assistance when getting out of bed, Assessed \T\             

      reinforced patient's understanding of fall precautions, Provided non-skid footwear,         

      Hourly rounding (assess needs \T\ fall precautionary measures) done, Used ambulatory aids   

      as needed (educated on \T\ assisted with), Used gait belt as appropriate. Abuse screen:     

      Denies threats or abuse. Denies injuries from another. Nutritional screening: No            

      deficits noted. Tuberculosis screening: No symptoms or risk factors identified.             

                                                                                                  

Assessment:                                                                                       

08:17 General: Appears in no apparent distress. uncomfortable, Behavior is calm, cooperative, ko1 

      appropriate for age. Pain: Complains of pain in pelvis. Neuro: No deficits noted.           

      Cardiovascular: No deficits noted. Respiratory: No deficits noted. GI: No deficits          

      noted. : Reports burning with urination, pain in suprapubic area. EENT: No deficits       

      noted. Derm: No deficits noted. Musculoskeletal: No deficits noted.                         

                                                                                                  

Vital Signs:                                                                                      

08:10  / 90; Pulse 90; Resp 16; Temp 97.9; Pulse Ox 100% on R/A; Pain 5/10;             ll1 

09:19  / 75; Pulse 87; Resp 16; Pulse Ox 100% ;                                         ko1 

08:10 Pain Scale: Adult                                                                       ll1 

                                                                                                  

ED Course:                                                                                        

08:01 Patient arrived in ED.                                                                  am2 

08:02 [facility]                                                        kb  

08:02 Martin New MD is Attending Physician.                                            kb  

08:02 Arm band placed on Patient placed in an exam room, on a stretcher.                      ll1 

08:12 Triage completed.                                                                       ll1 

08:16 Sirisha Bee, RN is Primary Nurse.                                                     ko1 

08:17 Patient has correct armband on for positive identification. Bed in low position. Call   ko1 

      light in reach. Pulse ox on. NIBP on. Warm blanket given.                                   

08:17 No provider procedures requiring assistance completed.                                  ko1 

08:20 Pregnancy Test, Urine Sent.                                                             ko1 

08:20 Urinalysis w/ reflexes Sent.                                                            ko1 

08:55 Inserted saline lock: 20 gauge in right antecubital area, using aseptic technique.      ko1 

      Blood collected.                                                                            

09:00 Basic Metabolic Panel Sent.                                                             ko1 

09:00 CBC with Diff Sent.                                                                     ko1 

09:00 Urine Culture Sent.                                                                     ko1 

09:05 CT Abd/Pelvis - IV Contrast Only In Process Unspecified.                                EDMS

10:16 IV discontinued, intact, bleeding controlled, No redness/swelling at site. Pressure     ko1 

      dressing applied.                                                                           

                                                                                                  

Administered Medications:                                                                         

09:50 Drug: Rocephin IV 1 grams Route: IV; Rate: calculated rate; Site: right antecubital;    ko1 

09:50 Drug: Ketorolac IVP 15 mg Route: IVP; Site: right antecubital;                          ko1 

                                                                                                  

                                                                                                  

Medication:                                                                                       

08:17 VIS not applicable for this client.                                                     ko1 

                                                                                                  

Outcome:                                                                                          

09:40 Discharge ordered by MD. douglas  

10:16 Discharged to home ambulatory.                                                          ko1 

10:16 Condition: improved                                                                         

10:16 Discharge instructions given to patient, Instructed on discharge instructions, follow       

      up and referral plans. medication usage, Demonstrated understanding of instructions,        

      follow-up care, medications, Prescriptions given X 1.                                       

10:17 Patient left the ED.                                                                    ko1 

                                                                                                  

Signatures:                                                                                       

Dispatcher [employer]                           EDMS                                                 

Meron Deleon, DALTONP-C                 FNP-Shaunna Ceron Lynsay, RN                       RN   ll1                                                  

Sirisha Bee RN                       RN   ko1                                                  

                                                                                                  

**************************************************************************************************

## 2023-05-18 NOTE — RAD REPORT
EXAM DESCRIPTION:  CT - Abdomen   Pelvis W Contrast - 5/18/2023 9:08 am

 

CLINICAL HISTORY:  Abdominal pain

 

COMPARISON:  none.

 

TECHNIQUE:  Computed axial tomography of the abdomen pelvis was obtained. 100 cc Isovue-300 was admin
istered intravenously. Oral contrast was not requested which limits evaluation of bowel and appendix

 

All CT scans are performed using dose optimization technique as appropriate and may include automated
 exposure control or mA/KV adjustment according to patient size.

 

FINDINGS:  Small gallstone

 

Spleen, pancreas, adrenals and kidneys unremarkable

 

Normal appendix

 

No evidence of diverticulitis.

 

Small left ovarian cyst. No significant free fluid

 

Bladder wall thickening

 

 

IMPRESSION:  Cholelithiasis without evidence cholecystitis

 

Bladder wall thickening probably indicating inflammation. Followup is recommended